# Patient Record
Sex: MALE | Race: NATIVE HAWAIIAN OR OTHER PACIFIC ISLANDER | HISPANIC OR LATINO
[De-identification: names, ages, dates, MRNs, and addresses within clinical notes are randomized per-mention and may not be internally consistent; named-entity substitution may affect disease eponyms.]

---

## 2018-06-14 ENCOUNTER — APPOINTMENT (OUTPATIENT)
Dept: INTERNAL MEDICINE | Facility: CLINIC | Age: 53
End: 2018-06-14
Payer: MEDICAID

## 2018-06-14 VITALS
WEIGHT: 201 LBS | DIASTOLIC BLOOD PRESSURE: 84 MMHG | OXYGEN SATURATION: 95 % | TEMPERATURE: 98.2 F | BODY MASS INDEX: 28.77 KG/M2 | HEART RATE: 66 BPM | SYSTOLIC BLOOD PRESSURE: 124 MMHG | HEIGHT: 70 IN

## 2018-06-14 VITALS — SYSTOLIC BLOOD PRESSURE: 130 MMHG | DIASTOLIC BLOOD PRESSURE: 90 MMHG

## 2018-06-14 DIAGNOSIS — R10.9 UNSPECIFIED ABDOMINAL PAIN: ICD-10-CM

## 2018-06-14 PROCEDURE — 36415 COLL VENOUS BLD VENIPUNCTURE: CPT

## 2018-06-14 PROCEDURE — 81003 URINALYSIS AUTO W/O SCOPE: CPT | Mod: QW

## 2018-06-14 PROCEDURE — 99204 OFFICE O/P NEW MOD 45 MIN: CPT | Mod: 25

## 2018-06-14 NOTE — PHYSICAL EXAM
[No Acute Distress] : no acute distress [Well Nourished] : well nourished [Well Developed] : well developed [Well-Appearing] : well-appearing [Normal Sclera/Conjunctiva] : normal sclera/conjunctiva [PERRL] : pupils equal round and reactive to light [Normal Outer Ear/Nose] : the outer ears and nose were normal in appearance [Normal Oropharynx] : the oropharynx was normal [No JVD] : no jugular venous distention [Supple] : supple [No Lymphadenopathy] : no lymphadenopathy [Thyroid Normal, No Nodules] : the thyroid was normal and there were no nodules present [No Respiratory Distress] : no respiratory distress  [Clear to Auscultation] : lungs were clear to auscultation bilaterally [No Accessory Muscle Use] : no accessory muscle use [Normal Rate] : normal rate  [Regular Rhythm] : with a regular rhythm [Normal S1, S2] : normal S1 and S2 [No Murmur] : no murmur heard [No Carotid Bruits] : no carotid bruits [No Abdominal Bruit] : a ~M bruit was not heard ~T in the abdomen [No Edema] : there was no peripheral edema [No Extremity Clubbing/Cyanosis] : no extremity clubbing/cyanosis [Normal Appearance] : normal in appearance [No Masses] : no palpable masses [No Axillary Lymphadenopathy] : no axillary lymphadenopathy [Soft] : abdomen soft [Non Tender] : non-tender [Non-distended] : non-distended [No HSM] : no HSM [Normal Bowel Sounds] : normal bowel sounds [Normal Supraclavicular Nodes] : no supraclavicular lymphadenopathy [Normal Axillary Nodes] : no axillary lymphadenopathy [Normal Anterior Cervical Nodes] : no anterior cervical lymphadenopathy [Normal Gait] : normal gait [Coordination Grossly Intact] : coordination grossly intact [Speech Grossly Normal] : speech grossly normal [Memory Grossly Normal] : memory grossly normal [Normal Affect] : the affect was normal [Normal Mood] : the mood was normal [Normal Insight/Judgement] : insight and judgment were intact [de-identified] : Lesion on scalp

## 2018-06-14 NOTE — REVIEW OF SYSTEMS
[Back Pain] : back pain [Negative] : Heme/Lymph [Joint Pain] : no joint pain [Joint Stiffness] : no joint stiffness [Muscle Pain] : no muscle pain [Muscle Weakness] : no muscle weakness [Joint Swelling] : no joint swelling

## 2018-06-14 NOTE — ASSESSMENT
[FreeTextEntry1] : Patient is a 52-year-old  male with history of nephrolithiasis who was traveling through the [place] developed acute onset flank pain and transaminitis who presents for followup\par \par #1 flank pain one residual 2/10 pain\par Urine dip negative for hematuria white cells\par Observe Tylenol for pain\par \par #2 isolated elevated blood pressure\par Observe\par \par #3 possible transaminitis\par Check LFTs\par \par #4 scalp lesion\par Referral to dermatology\par \par #5 health maintenance\par Check routine bloods\par Colonoscopy 2 years ago normal\par Followup in 2 weeks schedule CPE in future

## 2018-06-15 LAB
ALBUMIN SERPL ELPH-MCNC: 4.3 G/DL
ALP BLD-CCNC: 88 U/L
ALT SERPL-CCNC: 44 U/L
ANION GAP SERPL CALC-SCNC: 13 MMOL/L
AST SERPL-CCNC: 36 U/L
BASOPHILS # BLD AUTO: 0.04 K/UL
BASOPHILS NFR BLD AUTO: 0.5 %
BILIRUB SERPL-MCNC: 0.3 MG/DL
BUN SERPL-MCNC: 13 MG/DL
CALCIUM SERPL-MCNC: 9.6 MG/DL
CHLORIDE SERPL-SCNC: 100 MMOL/L
CHOLEST SERPL-MCNC: 198 MG/DL
CHOLEST/HDLC SERPL: 7.1 RATIO
CO2 SERPL-SCNC: 25 MMOL/L
CREAT SERPL-MCNC: 1.22 MG/DL
EOSINOPHIL # BLD AUTO: 0.11 K/UL
EOSINOPHIL NFR BLD AUTO: 1.5 %
GLUCOSE SERPL-MCNC: 78 MG/DL
HBA1C MFR BLD HPLC: 6.3 %
HBV SURFACE AB SER QL: REACTIVE
HBV SURFACE AG SER QL: NONREACTIVE
HCT VFR BLD CALC: 45.5 %
HCV AB SER QL: NONREACTIVE
HCV S/CO RATIO: 0.19 S/CO
HDLC SERPL-MCNC: 28 MG/DL
HGB BLD-MCNC: 14.9 G/DL
IMM GRANULOCYTES NFR BLD AUTO: 0.1 %
LDLC SERPL CALC-MCNC: 144 MG/DL
LYMPHOCYTES # BLD AUTO: 2.19 K/UL
LYMPHOCYTES NFR BLD AUTO: 29.8 %
MAN DIFF?: NORMAL
MCHC RBC-ENTMCNC: 29.4 PG
MCHC RBC-ENTMCNC: 32.7 GM/DL
MCV RBC AUTO: 89.7 FL
MONOCYTES # BLD AUTO: 0.54 K/UL
MONOCYTES NFR BLD AUTO: 7.3 %
NEUTROPHILS # BLD AUTO: 4.46 K/UL
NEUTROPHILS NFR BLD AUTO: 60.8 %
PLATELET # BLD AUTO: 239 K/UL
POTASSIUM SERPL-SCNC: 4.5 MMOL/L
PROT SERPL-MCNC: 8.5 G/DL
PSA SERPL-MCNC: 1.31 NG/ML
RBC # BLD: 5.07 M/UL
RBC # FLD: 14.2 %
SODIUM SERPL-SCNC: 138 MMOL/L
TRIGL SERPL-MCNC: 131 MG/DL
WBC # FLD AUTO: 7.35 K/UL

## 2018-06-28 ENCOUNTER — EMERGENCY (EMERGENCY)
Facility: HOSPITAL | Age: 53
LOS: 1 days | Discharge: HOME | End: 2018-06-28
Attending: EMERGENCY MEDICINE
Payer: MEDICAID

## 2018-06-28 VITALS
DIASTOLIC BLOOD PRESSURE: 98 MMHG | OXYGEN SATURATION: 100 % | SYSTOLIC BLOOD PRESSURE: 148 MMHG | RESPIRATION RATE: 18 BRPM | HEART RATE: 78 BPM

## 2018-06-28 VITALS
DIASTOLIC BLOOD PRESSURE: 92 MMHG | TEMPERATURE: 98 F | RESPIRATION RATE: 18 BRPM | SYSTOLIC BLOOD PRESSURE: 142 MMHG | OXYGEN SATURATION: 99 % | HEART RATE: 76 BPM

## 2018-06-28 LAB
ALBUMIN SERPL ELPH-MCNC: 4 G/DL — SIGNIFICANT CHANGE UP (ref 3.3–5)
ALP SERPL-CCNC: 88 U/L — SIGNIFICANT CHANGE UP (ref 40–120)
ALT FLD-CCNC: 37 U/L — SIGNIFICANT CHANGE UP (ref 10–45)
ANION GAP SERPL CALC-SCNC: 11 MMOL/L — SIGNIFICANT CHANGE UP (ref 5–17)
APPEARANCE UR: CLEAR — SIGNIFICANT CHANGE UP
AST SERPL-CCNC: 27 U/L — SIGNIFICANT CHANGE UP (ref 10–40)
BASOPHILS # BLD AUTO: 0 K/UL — SIGNIFICANT CHANGE UP (ref 0–0.2)
BASOPHILS NFR BLD AUTO: 0.4 % — SIGNIFICANT CHANGE UP (ref 0–2)
BILIRUB SERPL-MCNC: 0.3 MG/DL — SIGNIFICANT CHANGE UP (ref 0.2–1.2)
BILIRUB UR-MCNC: NEGATIVE — SIGNIFICANT CHANGE UP
BUN SERPL-MCNC: 14 MG/DL — SIGNIFICANT CHANGE UP (ref 7–23)
CALCIUM SERPL-MCNC: 9.4 MG/DL — SIGNIFICANT CHANGE UP (ref 8.4–10.5)
CHLORIDE SERPL-SCNC: 100 MMOL/L — SIGNIFICANT CHANGE UP (ref 96–108)
CO2 SERPL-SCNC: 25 MMOL/L — SIGNIFICANT CHANGE UP (ref 22–31)
COLOR SPEC: SIGNIFICANT CHANGE UP
COMMENT - URINE: SIGNIFICANT CHANGE UP
CREAT SERPL-MCNC: 1.05 MG/DL — SIGNIFICANT CHANGE UP (ref 0.5–1.3)
DIFF PNL FLD: NEGATIVE — SIGNIFICANT CHANGE UP
EOSINOPHIL # BLD AUTO: 0.1 K/UL — SIGNIFICANT CHANGE UP (ref 0–0.5)
EOSINOPHIL NFR BLD AUTO: 0.8 % — SIGNIFICANT CHANGE UP (ref 0–6)
EPI CELLS # UR: SIGNIFICANT CHANGE UP /HPF
GLUCOSE SERPL-MCNC: 163 MG/DL — HIGH (ref 70–99)
GLUCOSE UR QL: NEGATIVE — SIGNIFICANT CHANGE UP
HCT VFR BLD CALC: 43.4 % — SIGNIFICANT CHANGE UP (ref 39–50)
HGB BLD-MCNC: 14.6 G/DL — SIGNIFICANT CHANGE UP (ref 13–17)
KETONES UR-MCNC: NEGATIVE — SIGNIFICANT CHANGE UP
LEUKOCYTE ESTERASE UR-ACNC: NEGATIVE — SIGNIFICANT CHANGE UP
LYMPHOCYTES # BLD AUTO: 1.3 K/UL — SIGNIFICANT CHANGE UP (ref 1–3.3)
LYMPHOCYTES # BLD AUTO: 14.6 % — SIGNIFICANT CHANGE UP (ref 13–44)
MCHC RBC-ENTMCNC: 30.3 PG — SIGNIFICANT CHANGE UP (ref 27–34)
MCHC RBC-ENTMCNC: 33.6 GM/DL — SIGNIFICANT CHANGE UP (ref 32–36)
MCV RBC AUTO: 90 FL — SIGNIFICANT CHANGE UP (ref 80–100)
MONOCYTES # BLD AUTO: 0.3 K/UL — SIGNIFICANT CHANGE UP (ref 0–0.9)
MONOCYTES NFR BLD AUTO: 3.4 % — SIGNIFICANT CHANGE UP (ref 2–14)
NEUTROPHILS # BLD AUTO: 7.1 K/UL — SIGNIFICANT CHANGE UP (ref 1.8–7.4)
NEUTROPHILS NFR BLD AUTO: 80.8 % — HIGH (ref 43–77)
NITRITE UR-MCNC: NEGATIVE — SIGNIFICANT CHANGE UP
PH UR: 6 — SIGNIFICANT CHANGE UP (ref 5–8)
PLATELET # BLD AUTO: 184 K/UL — SIGNIFICANT CHANGE UP (ref 150–400)
POTASSIUM SERPL-MCNC: 3.9 MMOL/L — SIGNIFICANT CHANGE UP (ref 3.5–5.3)
POTASSIUM SERPL-SCNC: 3.9 MMOL/L — SIGNIFICANT CHANGE UP (ref 3.5–5.3)
PROT SERPL-MCNC: 7.7 G/DL — SIGNIFICANT CHANGE UP (ref 6–8.3)
PROT UR-MCNC: NEGATIVE — SIGNIFICANT CHANGE UP
RBC # BLD: 4.82 M/UL — SIGNIFICANT CHANGE UP (ref 4.2–5.8)
RBC # FLD: 12.4 % — SIGNIFICANT CHANGE UP (ref 10.3–14.5)
RBC CASTS # UR COMP ASSIST: SIGNIFICANT CHANGE UP /HPF (ref 0–2)
SODIUM SERPL-SCNC: 136 MMOL/L — SIGNIFICANT CHANGE UP (ref 135–145)
SP GR SPEC: 1.02 — SIGNIFICANT CHANGE UP (ref 1.01–1.02)
UROBILINOGEN FLD QL: NEGATIVE — SIGNIFICANT CHANGE UP
WBC # BLD: 8.8 K/UL — SIGNIFICANT CHANGE UP (ref 3.8–10.5)
WBC # FLD AUTO: 8.8 K/UL — SIGNIFICANT CHANGE UP (ref 3.8–10.5)
WBC UR QL: SIGNIFICANT CHANGE UP /HPF (ref 0–5)

## 2018-06-28 PROCEDURE — 99284 EMERGENCY DEPT VISIT MOD MDM: CPT | Mod: 25

## 2018-06-28 PROCEDURE — 87086 URINE CULTURE/COLONY COUNT: CPT

## 2018-06-28 PROCEDURE — 99283 EMERGENCY DEPT VISIT LOW MDM: CPT

## 2018-06-28 PROCEDURE — 80053 COMPREHEN METABOLIC PANEL: CPT

## 2018-06-28 PROCEDURE — 81001 URINALYSIS AUTO W/SCOPE: CPT

## 2018-06-28 PROCEDURE — 85027 COMPLETE CBC AUTOMATED: CPT

## 2018-06-28 RX ORDER — ACETAMINOPHEN 500 MG
975 TABLET ORAL ONCE
Qty: 0 | Refills: 0 | Status: COMPLETED | OUTPATIENT
Start: 2018-06-28 | End: 2018-06-28

## 2018-06-28 RX ORDER — IBUPROFEN 200 MG
600 TABLET ORAL ONCE
Qty: 0 | Refills: 0 | Status: COMPLETED | OUTPATIENT
Start: 2018-06-28 | End: 2018-06-28

## 2018-06-28 RX ADMIN — Medication 600 MILLIGRAM(S): at 06:31

## 2018-06-28 RX ADMIN — Medication 975 MILLIGRAM(S): at 06:31

## 2018-06-28 NOTE — ED ADULT NURSE NOTE - PMH
Pt verbalized that she called the dr's office to find out what were Nephrolithiasis    Seasonal Allergies

## 2018-06-28 NOTE — ED PROVIDER NOTE - MEDICAL DECISION MAKING DETAILS
Plan for screening labs/UA, pain control, re-evaluate, clinically very benign abdomen so low pretest probability for surgical emergency, patient seems too comfortable for renal colic, low pretest probability for aortic catastrophe.

## 2018-06-28 NOTE — ED PROVIDER NOTE - ATTENDING CONTRIBUTION TO CARE
Patient with history of prior kidney stones presenting with back pain radiating into the abdomen associated with nausea and vomiting x2 overnight.  Reportedly feels like prior renal stones.  No fevers or chills, normal bowel movements, no home pain medications.  On exam patient very well appearing, no distress, vital signs within normal limits, equal radial pulses, abdomen soft, non tender, non distended, no palpable masses, bilateral lumbar paraspinal tenderness reproduces complaint.  Plan for screening labs/UA, pain control, re-evaluate, clinically very benign abdomen so low pretest probability for surgical emergency, patient seems too comfortable for renal colic, low pretest probability for aortic catastrophe. Patient seen and evaluated with resident/NP/PA, however HPI, ROS, PE and MDM as documented authored by myself - Cliff Caldwell MD

## 2018-06-28 NOTE — ED PROVIDER NOTE - PHYSICAL EXAMINATION
On exam patient very well appearing, no distress, vital signs within normal limits, equal radial pulses, abdomen soft, non tender, non distended, no palpable masses, bilateral lumbar paraspinal tenderness reproduces complaint.

## 2018-06-28 NOTE — ED ADULT NURSE NOTE - OBJECTIVE STATEMENT
53 y/o male presenting to ED c/o abd. pain, pt has hx of kidney stones. States pain feels the same as the last kidney stone flare up. Safety and comfort measures maintained. Patient undressed and placed into gown, call bell in hand and side rails up for safety. warm blanket provided, vital signs stable, pt in no acute distress.

## 2018-06-28 NOTE — ED PROVIDER NOTE - OBJECTIVE STATEMENT
Patient with history of prior kidney stones presenting with back pain radiating into the abdomen associated with nausea and vomiting x2 overnight.  Reportedly feels like prior renal stones.  No fevers or chills, normal bowel movements, no home pain medications

## 2018-06-29 LAB
CULTURE RESULTS: NO GROWTH — SIGNIFICANT CHANGE UP
SPECIMEN SOURCE: SIGNIFICANT CHANGE UP

## 2018-07-19 ENCOUNTER — APPOINTMENT (OUTPATIENT)
Dept: INTERNAL MEDICINE | Facility: CLINIC | Age: 53
End: 2018-07-19
Payer: MEDICAID

## 2018-07-19 VITALS
WEIGHT: 200 LBS | SYSTOLIC BLOOD PRESSURE: 126 MMHG | HEART RATE: 86 BPM | OXYGEN SATURATION: 99 % | BODY MASS INDEX: 28.63 KG/M2 | DIASTOLIC BLOOD PRESSURE: 80 MMHG | TEMPERATURE: 97.5 F | HEIGHT: 70 IN

## 2018-07-19 VITALS — SYSTOLIC BLOOD PRESSURE: 132 MMHG | DIASTOLIC BLOOD PRESSURE: 92 MMHG

## 2018-07-19 DIAGNOSIS — L98.9 DISORDER OF THE SKIN AND SUBCUTANEOUS TISSUE, UNSPECIFIED: ICD-10-CM

## 2018-07-19 PROCEDURE — 99214 OFFICE O/P EST MOD 30 MIN: CPT

## 2018-07-19 NOTE — PHYSICAL EXAM
[No Acute Distress] : no acute distress [Well Nourished] : well nourished [Well Developed] : well developed [Well-Appearing] : well-appearing [Normal Voice/Communication] : normal voice/communication [Normal Sclera/Conjunctiva] : normal sclera/conjunctiva [PERRL] : pupils equal round and reactive to light [Normal Outer Ear/Nose] : the outer ears and nose were normal in appearance [Normal Oropharynx] : the oropharynx was normal [No JVD] : no jugular venous distention [Supple] : supple [No Lymphadenopathy] : no lymphadenopathy [No Respiratory Distress] : no respiratory distress  [Clear to Auscultation] : lungs were clear to auscultation bilaterally [No Accessory Muscle Use] : no accessory muscle use [Normal Rate] : normal rate  [Regular Rhythm] : with a regular rhythm [Normal S1, S2] : normal S1 and S2 [No Murmur] : no murmur heard [Soft] : abdomen soft [Non Tender] : non-tender [Non-distended] : non-distended [No HSM] : no HSM [Normal Bowel Sounds] : normal bowel sounds [No CVA Tenderness] : no CVA  tenderness [No Spinal Tenderness] : no spinal tenderness

## 2018-07-19 NOTE — HISTORY OF PRESENT ILLNESS
[FreeTextEntry1] : F/u flank pain and transaminitis [de-identified] : Patient is a 52-year-old male with history of nephrolithiasis who presents for followup patient feels well no further flank pain dysuria but noted to have prediabetes and an isolated elevated blood pressure. Patient feels well denies chest pain, shortness of breath, lightheadedness, dizziness drinking iced coffee

## 2018-07-19 NOTE — ASSESSMENT
[FreeTextEntry1] : Patient is a 52-year-old male with history of overweight nephrolithiasis who presents for followup and noted to be borderline hypertensive and prediabetic\par \par #1 isolated elevated blood pressure\par  on diet decrease caffeine\par Decrease salt intake\par Observe followup in 2 months\par \par #2 prediabetes\par Last hemoglobin A1c 6.3\par Discuss diet\par \par #3 borderline hypercholesterolemia\par  HDL 28\par Discuss diet and exercise\par \par #4 scalp lesion\par Referral to dermatology\par \par #5 health maintenance\par Schedule CPE\par \par #6 flank pain history of lithiasis\par Presently asymptomatic observe encourage hydration

## 2018-07-19 NOTE — HEALTH RISK ASSESSMENT
[] : No [No falls in past year] : Patient reported no falls in the past year [0] : 2) Feeling down, depressed, or hopeless: Not at all (0) [de-identified] : social

## 2018-08-02 ENCOUNTER — APPOINTMENT (OUTPATIENT)
Dept: INTERNAL MEDICINE | Facility: CLINIC | Age: 53
End: 2018-08-02
Payer: MEDICAID

## 2018-08-02 VITALS — SYSTOLIC BLOOD PRESSURE: 124 MMHG | DIASTOLIC BLOOD PRESSURE: 80 MMHG

## 2018-08-02 VITALS
HEART RATE: 79 BPM | BODY MASS INDEX: 27.63 KG/M2 | WEIGHT: 193 LBS | HEIGHT: 70 IN | DIASTOLIC BLOOD PRESSURE: 80 MMHG | SYSTOLIC BLOOD PRESSURE: 128 MMHG | OXYGEN SATURATION: 97 %

## 2018-08-02 DIAGNOSIS — E66.3 OVERWEIGHT: ICD-10-CM

## 2018-08-02 DIAGNOSIS — R03.0 ELEVATED BLOOD-PRESSURE READING, W/OUT DIAGNOSIS OF HYPERTENSION: ICD-10-CM

## 2018-08-02 PROCEDURE — 99214 OFFICE O/P EST MOD 30 MIN: CPT

## 2018-08-02 NOTE — PHYSICAL EXAM
[No Acute Distress] : no acute distress [Well Nourished] : well nourished [Well Developed] : well developed [Well-Appearing] : well-appearing [No JVD] : no jugular venous distention [Supple] : supple [No Lymphadenopathy] : no lymphadenopathy [No Respiratory Distress] : no respiratory distress  [Clear to Auscultation] : lungs were clear to auscultation bilaterally [No Accessory Muscle Use] : no accessory muscle use [Normal Rate] : normal rate  [Regular Rhythm] : with a regular rhythm [Normal S1, S2] : normal S1 and S2 [No Abdominal Bruit] : a ~M bruit was not heard ~T in the abdomen [No Varicosities] : no varicosities [No Extremity Clubbing/Cyanosis] : no extremity clubbing/cyanosis [No Palpable Aorta] : no palpable aorta [Soft] : abdomen soft [Non Tender] : non-tender [Non-distended] : non-distended [No HSM] : no HSM [Normal Bowel Sounds] : normal bowel sounds [No CVA Tenderness] : no CVA  tenderness [No Spinal Tenderness] : no spinal tenderness

## 2018-08-02 NOTE — ASSESSMENT
[FreeTextEntry1] : Patient is a 52-year-old man with history of overweight, nephrolithiasis, isolated elevated blood pressure, prediabetes and borderline hypercholesterolemia who presents for followup of hypertension\par \par #1 hypertension\par Normal tensive today\par Observe\par \par #2 overweight\par Lost 7 pounds since last visit\par Continue diet exercise goal weight around one 8185\par \par #3 prediabetes\par Asymptomatic observe\par \par #4 borderline hypercholesterolemia\par  on diet\par Followup in 3-4 months

## 2018-08-02 NOTE — HISTORY OF PRESENT ILLNESS
[FreeTextEntry1] : followup for hypertension overweight and type 2 diabetes [de-identified] : Patient is a 52-year-old male with history of nephrolithiasis overweight, prediabetes, elevated blood pressure, borderline hyperlipidemia who presents for followup patient feels well has lost 70 pounds since last visit feels well denies headache, dizziness, lightheadedness, palpitation, polyuria,

## 2018-11-15 ENCOUNTER — APPOINTMENT (OUTPATIENT)
Dept: INTERNAL MEDICINE | Facility: CLINIC | Age: 53
End: 2018-11-15

## 2018-12-17 ENCOUNTER — APPOINTMENT (OUTPATIENT)
Dept: INTERNAL MEDICINE | Facility: CLINIC | Age: 53
End: 2018-12-17
Payer: MEDICAID

## 2018-12-17 VITALS
OXYGEN SATURATION: 98 % | SYSTOLIC BLOOD PRESSURE: 112 MMHG | DIASTOLIC BLOOD PRESSURE: 68 MMHG | HEART RATE: 77 BPM | BODY MASS INDEX: 27.35 KG/M2 | HEIGHT: 70 IN | TEMPERATURE: 98.9 F | WEIGHT: 191 LBS

## 2018-12-17 DIAGNOSIS — E78.5 HYPERLIPIDEMIA, UNSPECIFIED: ICD-10-CM

## 2018-12-17 DIAGNOSIS — R73.03 PREDIABETES.: ICD-10-CM

## 2018-12-17 DIAGNOSIS — R76.9 ABNORMAL IMMUNOLOGICAL FINDING IN SERUM, UNSPECIFIED: ICD-10-CM

## 2018-12-17 LAB
BILIRUB UR QL STRIP: NORMAL
CLARITY UR: CLEAR
COLLECTION METHOD: NORMAL
GLUCOSE UR-MCNC: NORMAL
HCG UR QL: 0.2 EU/DL
HGB UR QL STRIP.AUTO: NORMAL
KETONES UR-MCNC: NORMAL
LEUKOCYTE ESTERASE UR QL STRIP: NORMAL
NITRITE UR QL STRIP: NORMAL
PH UR STRIP: 7
PROT UR STRIP-MCNC: NORMAL
SP GR UR STRIP: 1.02

## 2018-12-17 PROCEDURE — 36415 COLL VENOUS BLD VENIPUNCTURE: CPT

## 2018-12-17 PROCEDURE — 81003 URINALYSIS AUTO W/O SCOPE: CPT | Mod: QW

## 2018-12-17 PROCEDURE — G0008: CPT

## 2018-12-17 PROCEDURE — 90686 IIV4 VACC NO PRSV 0.5 ML IM: CPT

## 2018-12-17 PROCEDURE — 99214 OFFICE O/P EST MOD 30 MIN: CPT | Mod: 25

## 2018-12-17 NOTE — PHYSICAL EXAM
[No Acute Distress] : no acute distress [Well Nourished] : well nourished [Well Developed] : well developed [Well-Appearing] : well-appearing [Normal Voice/Communication] : normal voice/communication [No JVD] : no jugular venous distention [Supple] : supple [No Respiratory Distress] : no respiratory distress  [Clear to Auscultation] : lungs were clear to auscultation bilaterally [No Accessory Muscle Use] : no accessory muscle use [Normal Rate] : normal rate  [Regular Rhythm] : with a regular rhythm [Normal S1, S2] : normal S1 and S2 [No Murmur] : no murmur heard [No Carotid Bruits] : no carotid bruits [No Abdominal Bruit] : a ~M bruit was not heard ~T in the abdomen [No Edema] : there was no peripheral edema [Normal Supraclavicular Nodes] : no supraclavicular lymphadenopathy [Normal Anterior Cervical Nodes] : no anterior cervical lymphadenopathy [de-identified] : Mild left flank tenderness

## 2018-12-17 NOTE — HEALTH RISK ASSESSMENT
[No falls in past year] : Patient reported no falls in the past year [0] : 2) Feeling down, depressed, or hopeless: Not at all (0) [] : No [de-identified] : social

## 2018-12-17 NOTE — ASSESSMENT
[FreeTextEntry1] : Patient is a 53-year-old  male with history of overweight, prediabetes, isolated elevated blood pressure, hyperlipidemia borderline, nephrolithiasis who presents for follow-up of isolated elevated blood pressure, prediabetes, borderline hypercholesterolemia and protein\par \par 1.  Abnormal total protein\par Repeat total protein\par \par 2.  Left flank pain history of nephrolithiasis\par Check UA\par \par 3.  Prediabetes\par Hemoglobin A1c lost 10 pounds\par \par 4.  Hyperlipidemia\par lipid Profile lost 10 pounds\par \par 5.  Health maintenance\par Influenza vaccine today\par \par

## 2018-12-17 NOTE — HISTORY OF PRESENT ILLNESS
[FreeTextEntry1] : Follow-up for abnormal total protein, diabetes and hyperlipidemia [de-identified] : \par \par The patient is a 53-year-old male with history of overweight, prediabetes, elevated total protein blood pressure and borderline hypercholesterolemia who presents for follow-up patient feels well remains on diet avoids carbs weights around 191 stable as high as 201 pounds he denies polyuria polydipsia chest pain shortness of breath

## 2019-03-19 LAB
ANION GAP SERPL CALC-SCNC: 10 MMOL/L
BUN SERPL-MCNC: 17 MG/DL
CALCIUM SERPL-MCNC: 9.4 MG/DL
CHLORIDE SERPL-SCNC: 102 MMOL/L
CHOLEST SERPL-MCNC: 194 MG/DL
CHOLEST/HDLC SERPL: 5.7 RATIO
CO2 SERPL-SCNC: 27 MMOL/L
CREAT SERPL-MCNC: 1.12 MG/DL
GLUCOSE SERPL-MCNC: 88 MG/DL
HBA1C MFR BLD HPLC: 5.9 %
HDLC SERPL-MCNC: 34 MG/DL
LDLC SERPL CALC-MCNC: 146 MG/DL
POTASSIUM SERPL-SCNC: 4.2 MMOL/L
PROT SERPL-MCNC: 8.1 G/DL
SODIUM SERPL-SCNC: 139 MMOL/L
TRIGL SERPL-MCNC: 69 MG/DL

## 2019-05-08 ENCOUNTER — TRANSCRIPTION ENCOUNTER (OUTPATIENT)
Age: 54
End: 2019-05-08

## 2019-06-20 ENCOUNTER — APPOINTMENT (OUTPATIENT)
Dept: INTERNAL MEDICINE | Facility: CLINIC | Age: 54
End: 2019-06-20

## 2019-11-19 NOTE — HISTORY OF PRESENT ILLNESS
[Dear  ___] : Dear  [unfilled], [Consult Letter:] : I had the pleasure of evaluating your patient, [unfilled]. [DrAmarilys  ___] : Dr. ABRAMS [FreeTextEntry8] : CC: bilateral flank pain\par Patient is a 52-year-old  male with history of nephrolithiasis who was vacationing in the [place] in April developed bilateral flank plain with a presumed diagnosis of kidney stone sonogram done in the [place] negative for kidney stones also noted to have elevated liver functions now presents for evaluation and care the patient still complains of residual flank pain denies abdominal pain, nausea, vomiting, diarrhea otherwise patient feels well\par \par

## 2019-11-21 ENCOUNTER — INPATIENT (INPATIENT)
Facility: HOSPITAL | Age: 54
LOS: 1 days | Discharge: HOME | DRG: 446 | End: 2019-11-23
Attending: SURGERY | Admitting: SURGERY
Payer: MEDICAID

## 2019-11-21 VITALS
HEART RATE: 85 BPM | DIASTOLIC BLOOD PRESSURE: 99 MMHG | TEMPERATURE: 98 F | HEIGHT: 69 IN | WEIGHT: 199.96 LBS | RESPIRATION RATE: 18 BRPM | OXYGEN SATURATION: 100 % | SYSTOLIC BLOOD PRESSURE: 183 MMHG

## 2019-11-21 DIAGNOSIS — R10.13 EPIGASTRIC PAIN: ICD-10-CM

## 2019-11-21 DIAGNOSIS — Z90.49 ACQUIRED ABSENCE OF OTHER SPECIFIED PARTS OF DIGESTIVE TRACT: Chronic | ICD-10-CM

## 2019-11-21 LAB
ALBUMIN SERPL ELPH-MCNC: 4.4 G/DL — SIGNIFICANT CHANGE UP (ref 3.3–5)
ALP SERPL-CCNC: 79 U/L — SIGNIFICANT CHANGE UP (ref 40–120)
ALT FLD-CCNC: 46 U/L — HIGH (ref 10–45)
ANION GAP SERPL CALC-SCNC: 12 MMOL/L — SIGNIFICANT CHANGE UP (ref 5–17)
APPEARANCE UR: CLEAR — SIGNIFICANT CHANGE UP
APTT BLD: 30.8 SEC — SIGNIFICANT CHANGE UP (ref 27.5–36.3)
AST SERPL-CCNC: 30 U/L — SIGNIFICANT CHANGE UP (ref 10–40)
BACTERIA # UR AUTO: 0 — SIGNIFICANT CHANGE UP
BASOPHILS # BLD AUTO: 0.04 K/UL — SIGNIFICANT CHANGE UP (ref 0–0.2)
BASOPHILS NFR BLD AUTO: 0.6 % — SIGNIFICANT CHANGE UP (ref 0–2)
BILIRUB SERPL-MCNC: 0.2 MG/DL — SIGNIFICANT CHANGE UP (ref 0.2–1.2)
BILIRUB UR-MCNC: NEGATIVE — SIGNIFICANT CHANGE UP
BLD GP AB SCN SERPL QL: NEGATIVE — SIGNIFICANT CHANGE UP
BUN SERPL-MCNC: 13 MG/DL — SIGNIFICANT CHANGE UP (ref 7–23)
CALCIUM SERPL-MCNC: 9.1 MG/DL — SIGNIFICANT CHANGE UP (ref 8.4–10.5)
CHLORIDE SERPL-SCNC: 101 MMOL/L — SIGNIFICANT CHANGE UP (ref 96–108)
CO2 SERPL-SCNC: 25 MMOL/L — SIGNIFICANT CHANGE UP (ref 22–31)
COLOR SPEC: SIGNIFICANT CHANGE UP
CREAT SERPL-MCNC: 1.04 MG/DL — SIGNIFICANT CHANGE UP (ref 0.5–1.3)
DIFF PNL FLD: NEGATIVE — SIGNIFICANT CHANGE UP
EOSINOPHIL # BLD AUTO: 0.11 K/UL — SIGNIFICANT CHANGE UP (ref 0–0.5)
EOSINOPHIL NFR BLD AUTO: 1.7 % — SIGNIFICANT CHANGE UP (ref 0–6)
EPI CELLS # UR: 0 /HPF — SIGNIFICANT CHANGE UP
GLUCOSE SERPL-MCNC: 151 MG/DL — HIGH (ref 70–99)
GLUCOSE UR QL: NEGATIVE — SIGNIFICANT CHANGE UP
HCT VFR BLD CALC: 42.2 % — SIGNIFICANT CHANGE UP (ref 39–50)
HGB BLD-MCNC: 14.5 G/DL — SIGNIFICANT CHANGE UP (ref 13–17)
HYALINE CASTS # UR AUTO: 2 /LPF — SIGNIFICANT CHANGE UP (ref 0–2)
IMM GRANULOCYTES NFR BLD AUTO: 0.2 % — SIGNIFICANT CHANGE UP (ref 0–1.5)
INR BLD: 1.25 RATIO — HIGH (ref 0.88–1.16)
KETONES UR-MCNC: NEGATIVE — SIGNIFICANT CHANGE UP
LEUKOCYTE ESTERASE UR-ACNC: NEGATIVE — SIGNIFICANT CHANGE UP
LIDOCAIN IGE QN: 29 U/L — SIGNIFICANT CHANGE UP (ref 7–60)
LYMPHOCYTES # BLD AUTO: 1.56 K/UL — SIGNIFICANT CHANGE UP (ref 1–3.3)
LYMPHOCYTES # BLD AUTO: 24 % — SIGNIFICANT CHANGE UP (ref 13–44)
MCHC RBC-ENTMCNC: 30.2 PG — SIGNIFICANT CHANGE UP (ref 27–34)
MCHC RBC-ENTMCNC: 34.4 GM/DL — SIGNIFICANT CHANGE UP (ref 32–36)
MCV RBC AUTO: 87.9 FL — SIGNIFICANT CHANGE UP (ref 80–100)
MONOCYTES # BLD AUTO: 0.51 K/UL — SIGNIFICANT CHANGE UP (ref 0–0.9)
MONOCYTES NFR BLD AUTO: 7.9 % — SIGNIFICANT CHANGE UP (ref 2–14)
NEUTROPHILS # BLD AUTO: 4.26 K/UL — SIGNIFICANT CHANGE UP (ref 1.8–7.4)
NEUTROPHILS NFR BLD AUTO: 65.6 % — SIGNIFICANT CHANGE UP (ref 43–77)
NITRITE UR-MCNC: NEGATIVE — SIGNIFICANT CHANGE UP
NRBC # BLD: 0 /100 WBCS — SIGNIFICANT CHANGE UP (ref 0–0)
PH UR: 7.5 — SIGNIFICANT CHANGE UP (ref 5–8)
PLATELET # BLD AUTO: 209 K/UL — SIGNIFICANT CHANGE UP (ref 150–400)
POTASSIUM SERPL-MCNC: 3.8 MMOL/L — SIGNIFICANT CHANGE UP (ref 3.5–5.3)
POTASSIUM SERPL-SCNC: 3.8 MMOL/L — SIGNIFICANT CHANGE UP (ref 3.5–5.3)
PROT SERPL-MCNC: 7.8 G/DL — SIGNIFICANT CHANGE UP (ref 6–8.3)
PROT UR-MCNC: NEGATIVE — SIGNIFICANT CHANGE UP
PROTHROM AB SERPL-ACNC: 14.3 SEC — HIGH (ref 10–12.9)
RBC # BLD: 4.8 M/UL — SIGNIFICANT CHANGE UP (ref 4.2–5.8)
RBC # FLD: 13.7 % — SIGNIFICANT CHANGE UP (ref 10.3–14.5)
RBC CASTS # UR COMP ASSIST: 1 /HPF — SIGNIFICANT CHANGE UP (ref 0–4)
RH IG SCN BLD-IMP: NEGATIVE — SIGNIFICANT CHANGE UP
SODIUM SERPL-SCNC: 138 MMOL/L — SIGNIFICANT CHANGE UP (ref 135–145)
SP GR SPEC: 1.02 — SIGNIFICANT CHANGE UP (ref 1.01–1.02)
UROBILINOGEN FLD QL: NEGATIVE — SIGNIFICANT CHANGE UP
WBC # BLD: 6.49 K/UL — SIGNIFICANT CHANGE UP (ref 3.8–10.5)
WBC # FLD AUTO: 6.49 K/UL — SIGNIFICANT CHANGE UP (ref 3.8–10.5)
WBC UR QL: 0 /HPF — SIGNIFICANT CHANGE UP (ref 0–5)

## 2019-11-21 PROCEDURE — 76705 ECHO EXAM OF ABDOMEN: CPT | Mod: 26

## 2019-11-21 PROCEDURE — 76705 ECHO EXAM OF ABDOMEN: CPT | Mod: 26,RT

## 2019-11-21 PROCEDURE — 93010 ELECTROCARDIOGRAM REPORT: CPT

## 2019-11-21 PROCEDURE — 71045 X-RAY EXAM CHEST 1 VIEW: CPT | Mod: 26

## 2019-11-21 PROCEDURE — 99221 1ST HOSP IP/OBS SF/LOW 40: CPT

## 2019-11-21 PROCEDURE — 99285 EMERGENCY DEPT VISIT HI MDM: CPT

## 2019-11-21 PROCEDURE — 74183 MRI ABD W/O CNTR FLWD CNTR: CPT | Mod: 26

## 2019-11-21 RX ORDER — INFLUENZA VIRUS VACCINE 15; 15; 15; 15 UG/.5ML; UG/.5ML; UG/.5ML; UG/.5ML
0.5 SUSPENSION INTRAMUSCULAR ONCE
Refills: 0 | Status: COMPLETED | OUTPATIENT
Start: 2019-11-21 | End: 2019-11-23

## 2019-11-21 RX ORDER — SODIUM CHLORIDE 9 MG/ML
1000 INJECTION INTRAMUSCULAR; INTRAVENOUS; SUBCUTANEOUS ONCE
Refills: 0 | Status: COMPLETED | OUTPATIENT
Start: 2019-11-21 | End: 2019-11-21

## 2019-11-21 RX ORDER — CEFOTETAN DISODIUM 1 G
1 VIAL (EA) INJECTION ONCE
Refills: 0 | Status: COMPLETED | OUTPATIENT
Start: 2019-11-21 | End: 2019-11-21

## 2019-11-21 RX ORDER — ONDANSETRON 8 MG/1
4 TABLET, FILM COATED ORAL ONCE
Refills: 0 | Status: COMPLETED | OUTPATIENT
Start: 2019-11-21 | End: 2019-11-21

## 2019-11-21 RX ORDER — SODIUM CHLORIDE 9 MG/ML
1000 INJECTION, SOLUTION INTRAVENOUS
Refills: 0 | Status: DISCONTINUED | OUTPATIENT
Start: 2019-11-21 | End: 2019-11-23

## 2019-11-21 RX ORDER — HEPARIN SODIUM 5000 [USP'U]/ML
5000 INJECTION INTRAVENOUS; SUBCUTANEOUS EVERY 12 HOURS
Refills: 0 | Status: DISCONTINUED | OUTPATIENT
Start: 2019-11-21 | End: 2019-11-23

## 2019-11-21 RX ORDER — CEFOTETAN DISODIUM 1 G
1 VIAL (EA) INJECTION EVERY 12 HOURS
Refills: 0 | Status: DISCONTINUED | OUTPATIENT
Start: 2019-11-21 | End: 2019-11-23

## 2019-11-21 RX ORDER — MORPHINE SULFATE 50 MG/1
4 CAPSULE, EXTENDED RELEASE ORAL ONCE
Refills: 0 | Status: DISCONTINUED | OUTPATIENT
Start: 2019-11-21 | End: 2019-11-21

## 2019-11-21 RX ADMIN — ONDANSETRON 4 MILLIGRAM(S): 8 TABLET, FILM COATED ORAL at 07:54

## 2019-11-21 RX ADMIN — SODIUM CHLORIDE 1000 MILLILITER(S): 9 INJECTION INTRAMUSCULAR; INTRAVENOUS; SUBCUTANEOUS at 07:54

## 2019-11-21 RX ADMIN — Medication 100 GRAM(S): at 12:37

## 2019-11-21 RX ADMIN — MORPHINE SULFATE 4 MILLIGRAM(S): 50 CAPSULE, EXTENDED RELEASE ORAL at 08:05

## 2019-11-21 RX ADMIN — HEPARIN SODIUM 5000 UNIT(S): 5000 INJECTION INTRAVENOUS; SUBCUTANEOUS at 17:10

## 2019-11-21 RX ADMIN — MORPHINE SULFATE 4 MILLIGRAM(S): 50 CAPSULE, EXTENDED RELEASE ORAL at 07:54

## 2019-11-21 NOTE — ED ADULT NURSE NOTE - NSIMPLEMENTINTERV_GEN_ALL_ED
Implemented All Universal Safety Interventions:  [place] to call system. Call bell, personal items and telephone within reach. Instruct patient to call for assistance. Room bathroom lighting operational. Non-slip footwear when patient is off stretcher. Physically safe environment: no spills, clutter or unnecessary equipment. Stretcher in lowest position, wheels locked, appropriate side rails in place.

## 2019-11-21 NOTE — ED PROVIDER NOTE - CHIEF COMPLAINT
The patient is a 53y Male complaining of The patient is a 53y Male complaining of abdominal pain/flank pain

## 2019-11-21 NOTE — H&P ADULT - NSICDXPASTSURGICALHX_GEN_ALL_CORE_FT
PAST SURGICAL HISTORY:  Acute Appendicitis s/p appendectomy 5 years ago.    cystoscopy, Ureteral stent     History of appendectomy

## 2019-11-21 NOTE — H&P ADULT - ATTENDING COMMENTS
Pt seen and examined yesterday, agree with above.     Pt presents with RUQ pain which woke him from sleep. He has had similar episodes in the past but never this severe and never lasting so long, which is why he sought medical attention. When I saw him, he noted that he was feeling better but still had moderate pain. He was not in any distress and his abdomen was soft and mildly TTP in the RUQ without peritonitis.    Labs reviewed, with very mild transaminase elevation.  Images personally reviewed: initial US was unclear regarding size of CBD, so repeat US was ordered and MRCP was ordered.   Repeat US showed normal CBD and multiple mobile gallstones with possible wall thickening.    A/P 52 y/o man with RUQ pain, gallstones on imaging, likely acute cholecystitis given ongoing pain and tenderness:  - Admit for IV antibiotics  - Laparoscopic, possible open cholecystectomy on this admission  - NPO  - IVF  - F/u MRCP results to ensure no choledocholithiasis

## 2019-11-21 NOTE — ED ADULT NURSE REASSESSMENT NOTE - NS ED NURSE REASSESS COMMENT FT1
Patient ambulated to the  bathroom said gown got wet and new gown and blanket provided and bed linens adjusted.

## 2019-11-21 NOTE — H&P ADULT - NSHPPHYSICALEXAM_GEN_ALL_CORE
Physical Exam:  	Gen: NAD, A&Ox3, WG/WD/WN  	HEENT: NC/AT, PERRL, supple neck, clear oropharynx, mucosa moist  	Pulm: CTA B/L  	CV: RRR  	Back: No spinal or (CVA) tenderness; no masses, hematomas, nor ecchymosis.  No step offs   	GI: +BS, soft, RUQ tenderness w/ refered pain to RLQ.  no rebound or guarding, nondistended                MSK:  FROMx4, no deformities                Vascular:  Warm equally bilateral,  no cyanosis, clubbing, nor edema  	Neuro: No focal deficits, sensation & strength grossly intact  	Psych: Normal affect and mood  	Skin: Warm, without rashes, with good turgor

## 2019-11-21 NOTE — ED ADULT NURSE NOTE - OBJECTIVE STATEMENT
53 year old male presents to ED c/o diffuse abdominal pain since this morning.  He said he had similar pain about 2 months ago while in [place] and had an ultrasound done which showed gallstones and the doctor told him to be checked out when he returned home to the [place].  Patient said pain resolved until this morning which is why he came to the ED.  Pain radiates to b/l flank and he said pain was worse after eating.  He did not taking anything for pain and said pain has been severe and constant since this morning.  He has also had some nausea, but denies: vomiting, chest pain, shortness of breath, fever, diarrhea, constipation, blood in stool or dysuria.

## 2019-11-21 NOTE — ED PROVIDER NOTE - OBJECTIVE STATEMENT
54 yo male with pmhx of kidney stones presenting with upper abdominal pain and BL flank pain for one day. Patient woke up at 3 am with right upper abdominal pain and nausea, nothing made pain better/worse. Has had previous kidney stones 1 year ago as well as ureteral stent in past.    Denies dysuria, hematuria, vomiting, fevers, CP, SOB

## 2019-11-21 NOTE — ED ADULT NURSE REASSESSMENT NOTE - NS ED NURSE REASSESS COMMENT FT1
Patient aware of pending MRI and filled out forms.  Patient is aware he may not eat right now per Dr. Bang.

## 2019-11-21 NOTE — ED ADULT NURSE NOTE - PSH
Acute Appendicitis  s/p appendectomy 5 years ago.  cystoscopy, Ureteral stent    History of appendectomy

## 2019-11-21 NOTE — ED ADULT NURSE REASSESSMENT NOTE - NS ED NURSE REASSESS COMMENT FT1
Patient awake and alert says pain is better than when he arrived.  Patient resting in stretcher, call bell at bedside.

## 2019-11-21 NOTE — ED PROVIDER NOTE - CLINICAL SUMMARY MEDICAL DECISION MAKING FREE TEXT BOX
jes 12 plus hrs of mod-severe non intermitt epi pain ho biliary colic, ho appendectomy, ruq pain no murphys , no scleral icterus - afebrile pocus to eval for biliary path -- analgesia and labs to eval for transaminitis and pancreatic etiology, and reeval

## 2019-11-21 NOTE — H&P ADULT - HISTORY OF PRESENT ILLNESS
GENERAL SURGERY H&P NOTE    HPI:   52yo M w/ PMH/o Nephrolithiasis s/p stent & cystoscopy, and s/p Appy presents w/ severe RUQ pain that awoke him from sleep and didn't get better.  Pt notes some but not complete relief of RUQ pain after morphine this am.  Pt notes occasion pain on & off over last few months but never as bad and quickly improved w/o n/v.   Pt eating as usual. About 6mo pt decided to eat healthier, more produce and less snacking.  He lost about 10pounds.  Pt denies nausea, vomiting, diarrhea.  His bowel habits unchanged.  No cp/palp/ sob/ dyspnea, no f/c/s.  This pain is different from that of when he's had kidney stones and drinks lots of water QD to help prevent repeat problems.      VITALS  T(C): 36.9 (11-21-19 @ 12:02), Max: 36.9 (11-21-19 @ 07:14)  HR: 68 (11-21-19 @ 12:02) (68 - 85)  BP: 113/69 (11-21-19 @ 12:02) (113/69 - 183/99)  RR: 16 (11-21-19 @ 12:02) (16 - 18)  SpO2: 98% (11-21-19 @ 12:02) (98% - 100%)  I&O's Detail GENERAL SURGERY H&P NOTE    HPI:   52yo M w/ PMH/o Nephrolithiasis s/p stent & cystoscopy, and s/p appy presents w/ severe RUQ pain that awoke him from sleep and didn't get better.  Pt notes some but not complete relief of RUQ pain after morphine this am.  Pt notes occasion pain on & off over last few months but never as bad and quickly improved w/o n/v.   Pt eating as usual. About 6mo pt decided to eat healthier, more produce and less snacking.  He lost about 10pounds.  Pt denies nausea, vomiting, diarrhea.  His bowel habits unchanged.  No cp/palp/ sob/ dyspnea, no f/c/s.  This pain is different from that of when he's had kidney stones and drinks lots of water QD to help prevent repeat problems.      VITALS  T(C): 36.9 (11-21-19 @ 12:02), Max: 36.9 (11-21-19 @ 07:14)  HR: 68 (11-21-19 @ 12:02) (68 - 85)  BP: 113/69 (11-21-19 @ 12:02) (113/69 - 183/99)  RR: 16 (11-21-19 @ 12:02) (16 - 18)  SpO2: 98% (11-21-19 @ 12:02) (98% - 100%)  I&O's Detail

## 2019-11-21 NOTE — H&P ADULT - NSHPLABSRESULTS_GEN_ALL_CORE
LABS:                        14.5   6.49  )-----------( 209      ( 21 Nov 2019 08:14 )             42.2     11-21    138  |  101  |  13  ----------------------------<  151<H>  3.8   |  25  |  1.04    Ca    9.1      21 Nov 2019 08:14    TPro  7.8  /  Alb  4.4  /  TBili  0.2  /  DBili  x   /  AST  30  /  ALT  46<H>  /  AlkPhos  79  11-21      PT/INR - ( 21 Nov 2019 08:14 )   PT: 14.3 sec;   INR: 1.25 ratio     PTT - ( 21 Nov 2019 08:14 )  PTT:30.8 sec    < from: US Abdomen Limited (ED) (11.21.19 @ 08:32) >    EXAM:  ER US ABDOMEN LTD      ORDER COMMENTS:      PROCEDURE DATE:  11/21/2019    FOCUSED ED ULTRASOUND REPORT          INTERPRETATION:  Grayscale imaging of the right upper quadrant was   performed.    The gallbladder was visualized and scanned in longitudinal and transverse   planes.  The anterior gallbladder wall measured  .39cm.  there is a dilated nonvascular area which likely represents a dilated    common bile duct measuring 1.4cm however cannot exclude that this is not   neck of gall bladder  multiple large stones in neck of gall bladder  Sonographic Hanson's sign not present.    IMPRESSION:Cholelithiasis with likely dilated bile duct, further imaging   needed

## 2019-11-21 NOTE — H&P ADULT - ASSESSMENT
A/P: 54yo M w/ PMH/o Nephrolithiasis and s/p Appy presents w/ Cholelithiasis, possible cholecystitis     Admit to ACS team  1. NPO/ IVF  2. Strict Intake and Output.  3. Cefotetan  4. Monitor BMP, LFTs, CBC  5. RUQ sono and MRCP  6. Possible GI Consult based on MRCP/ US  7. Serial abdominal exams  D/w ED resident     SURGERY, pager [phone]  D/w Dr Gilliam

## 2019-11-21 NOTE — PATIENT PROFILE ADULT - NSPROPTRIGHTREPPHONE_GEN_A_NUR
----- Message from Nayan Menchaca [phone]   [phone]

## 2019-11-21 NOTE — ED ADULT NURSE REASSESSMENT NOTE - NS ED NURSE REASSESS COMMENT FT1
Patient updated on plan and that he is pending an MRI which is scheduled for 0100.  Patient aware and is filling out forms for MRI.  Patient aware he will likely be admitted.

## 2019-11-21 NOTE — ED PROVIDER NOTE - PROGRESS NOTE DETAILS
pain crosses midline - highly unlikely for renal colic despite history of renal stones , no hydro on pocus -- pocus reveals multiple stones and what appears to be a significantly dilated cbd -- will consult gi and surg - pain improved after morphine -- awaiting surg and gi eval - Loi Weinstein PGY2  Surgery evaluated patient, will admit to dr. swenson for possible cholecystitis. GI recommends MRCP prior to consult.

## 2019-11-22 LAB
ALBUMIN SERPL ELPH-MCNC: 3.9 G/DL — SIGNIFICANT CHANGE UP (ref 3.3–5)
ALP SERPL-CCNC: 72 U/L — SIGNIFICANT CHANGE UP (ref 40–120)
ALT FLD-CCNC: 39 U/L — SIGNIFICANT CHANGE UP (ref 10–45)
ANION GAP SERPL CALC-SCNC: 14 MMOL/L — SIGNIFICANT CHANGE UP (ref 5–17)
AST SERPL-CCNC: 25 U/L — SIGNIFICANT CHANGE UP (ref 10–40)
BASOPHILS # BLD AUTO: 0.04 K/UL — SIGNIFICANT CHANGE UP (ref 0–0.2)
BASOPHILS NFR BLD AUTO: 0.6 % — SIGNIFICANT CHANGE UP (ref 0–2)
BILIRUB SERPL-MCNC: 0.5 MG/DL — SIGNIFICANT CHANGE UP (ref 0.2–1.2)
BLD GP AB SCN SERPL QL: NEGATIVE — SIGNIFICANT CHANGE UP
BUN SERPL-MCNC: 10 MG/DL — SIGNIFICANT CHANGE UP (ref 7–23)
CALCIUM SERPL-MCNC: 8.7 MG/DL — SIGNIFICANT CHANGE UP (ref 8.4–10.5)
CHLORIDE SERPL-SCNC: 100 MMOL/L — SIGNIFICANT CHANGE UP (ref 96–108)
CO2 SERPL-SCNC: 23 MMOL/L — SIGNIFICANT CHANGE UP (ref 22–31)
CREAT SERPL-MCNC: 1.01 MG/DL — SIGNIFICANT CHANGE UP (ref 0.5–1.3)
EOSINOPHIL # BLD AUTO: 0.13 K/UL — SIGNIFICANT CHANGE UP (ref 0–0.5)
EOSINOPHIL NFR BLD AUTO: 1.9 % — SIGNIFICANT CHANGE UP (ref 0–6)
GLUCOSE SERPL-MCNC: 124 MG/DL — HIGH (ref 70–99)
HBA1C BLD-MCNC: 6 % — HIGH (ref 4–5.6)
HCT VFR BLD CALC: 40.1 % — SIGNIFICANT CHANGE UP (ref 39–50)
HCV AB S/CO SERPL IA: 0.19 S/CO — SIGNIFICANT CHANGE UP (ref 0–0.99)
HCV AB SERPL-IMP: SIGNIFICANT CHANGE UP
HGB BLD-MCNC: 13.9 G/DL — SIGNIFICANT CHANGE UP (ref 13–17)
IMM GRANULOCYTES NFR BLD AUTO: 0.3 % — SIGNIFICANT CHANGE UP (ref 0–1.5)
LYMPHOCYTES # BLD AUTO: 1.93 K/UL — SIGNIFICANT CHANGE UP (ref 1–3.3)
LYMPHOCYTES # BLD AUTO: 28.3 % — SIGNIFICANT CHANGE UP (ref 13–44)
MCHC RBC-ENTMCNC: 30.3 PG — SIGNIFICANT CHANGE UP (ref 27–34)
MCHC RBC-ENTMCNC: 34.7 GM/DL — SIGNIFICANT CHANGE UP (ref 32–36)
MCV RBC AUTO: 87.4 FL — SIGNIFICANT CHANGE UP (ref 80–100)
MONOCYTES # BLD AUTO: 0.51 K/UL — SIGNIFICANT CHANGE UP (ref 0–0.9)
MONOCYTES NFR BLD AUTO: 7.5 % — SIGNIFICANT CHANGE UP (ref 2–14)
NEUTROPHILS # BLD AUTO: 4.18 K/UL — SIGNIFICANT CHANGE UP (ref 1.8–7.4)
NEUTROPHILS NFR BLD AUTO: 61.4 % — SIGNIFICANT CHANGE UP (ref 43–77)
NRBC # BLD: 0 /100 WBCS — SIGNIFICANT CHANGE UP (ref 0–0)
PLATELET # BLD AUTO: 205 K/UL — SIGNIFICANT CHANGE UP (ref 150–400)
POTASSIUM SERPL-MCNC: 3.7 MMOL/L — SIGNIFICANT CHANGE UP (ref 3.5–5.3)
POTASSIUM SERPL-SCNC: 3.7 MMOL/L — SIGNIFICANT CHANGE UP (ref 3.5–5.3)
PROT SERPL-MCNC: 7.1 G/DL — SIGNIFICANT CHANGE UP (ref 6–8.3)
RBC # BLD: 4.59 M/UL — SIGNIFICANT CHANGE UP (ref 4.2–5.8)
RBC # FLD: 13.7 % — SIGNIFICANT CHANGE UP (ref 10.3–14.5)
RH IG SCN BLD-IMP: NEGATIVE — SIGNIFICANT CHANGE UP
SODIUM SERPL-SCNC: 137 MMOL/L — SIGNIFICANT CHANGE UP (ref 135–145)
WBC # BLD: 6.81 K/UL — SIGNIFICANT CHANGE UP (ref 3.8–10.5)
WBC # FLD AUTO: 6.81 K/UL — SIGNIFICANT CHANGE UP (ref 3.8–10.5)

## 2019-11-22 PROCEDURE — 99231 SBSQ HOSP IP/OBS SF/LOW 25: CPT

## 2019-11-22 RX ADMIN — Medication 100 GRAM(S): at 00:52

## 2019-11-22 RX ADMIN — Medication 100 GRAM(S): at 11:25

## 2019-11-22 RX ADMIN — HEPARIN SODIUM 5000 UNIT(S): 5000 INJECTION INTRAVENOUS; SUBCUTANEOUS at 21:32

## 2019-11-22 RX ADMIN — HEPARIN SODIUM 5000 UNIT(S): 5000 INJECTION INTRAVENOUS; SUBCUTANEOUS at 05:14

## 2019-11-22 NOTE — PROGRESS NOTE ADULT - ATTENDING COMMENTS
Patient seen and examined on AM rounds  Patient admitted with RUQ abdominal pain on 11/21/19  Now states that pain is completely gone  vitals stable  non-toxic appearing  abdominal exam benign    LFTs / WBC = WNL  MRI / Ultrasound reports reviewed    - Likely biliary colic versus resolving acute cholecystitis  - Risks / benefits / alternatives to laparoscopic, possible open, cholecystectomy discussed with patient at length.    - I have offered the patient cholecystectomy on this admission (likely this afternoon).  I have discussed likely recurrent symptoms without cholecystectomy.  The patient is requesting trial of PO diet, and if no further pain, wants to be discharged with follow up in my office for elective cholecystectomy in the future.  - Will allow regular diet, and it no further symptoms, will discharge home.

## 2019-11-22 NOTE — PROGRESS NOTE ADULT - SUBJECTIVE AND OBJECTIVE BOX
SUBJECTIVE: Pt seen and examined at bedside with chief resident. Pt endorses, mild abdominal pain. Denies N/V, chest pain, SOB.      MEDICATIONS  (STANDING):  cefoTEtan  IVPB 1 Gram(s) IV Intermittent every 12 hours  dextrose 5% + sodium chloride 0.45% 1000 milliLiter(s) (100 mL/Hr) IV Continuous <Continuous>  heparin  Injectable 5000 Unit(s) SubCutaneous every 12 hours  influenza   Vaccine 0.5 milliLiter(s) IntraMuscular once    MEDICATIONS  (PRN):      OBJECTIVE:    Vital Signs Last 24 Hrs  T(C): 36.7 (2019 08:56), Max: 36.9 (2019 12:02)  T(F): 98.1 (2019 08:56), Max: 98.5 (2019 12:02)  HR: 68 (2019 08:56) (62 - 70)  BP: 108/69 (2019 08:56) (103/63 - 127/78)  BP(mean): --  RR: 18 (2019 08:56) (16 - 18)  SpO2: 96% (2019 08:56) (96% - 99%)    General Appearance: Resting comfortably, no acute distress  Chest: non-labored breathing, no respiratory distress  CV: Pulse regular presently  Abdomen: Soft, TTP RUQ, non-distended, no rebound tenderness or guarding  Extremities: warm and well perfused    I&O's Summary    2019 07:  -  2019 07:00  --------------------------------------------------------  IN: 1350 mL / OUT: 300 mL / NET: 1050 mL      I&O's Detail    2019 07:  -  2019 07:00  --------------------------------------------------------  IN:    dextrose 5% + sodium chloride 0.45%: 1300 mL    IV PiggyBack: 50 mL  Total IN: 1350 mL    OUT:    Voided: 300 mL  Total OUT: 300 mL    Total NET: 1050 mL            LABS:                        13.9   6.81  )-----------( 205      ( 2019 06:38 )             40.1         137  |  100  |  10  ----------------------------<  124<H>  3.7   |  23  |  1.01    Ca    8.7      2019 06:38    TPro  7.1  /  Alb  3.9  /  TBili  0.5  /  DBili  x   /  AST  25  /  ALT  39  /  AlkPhos  72      PT/INR - ( 2019 08:14 )   PT: 14.3 sec;   INR: 1.25 ratio         PTT - ( 2019 08:14 )  PTT:30.8 sec  Urinalysis Basic - ( 2019 10:50 )    Color: Light Yellow / Appearance: Clear / S.017 / pH: x  Gluc: x / Ketone: Negative  / Bili: Negative / Urobili: Negative   Blood: x / Protein: Negative / Nitrite: Negative   Leuk Esterase: Negative / RBC: 1 /hpf / WBC 0 /HPF   Sq Epi: x / Non Sq Epi: 0 /hpf / Bacteria: 0.0        RADIOLOGY & ADDITIONAL STUDIES:  < from: MR MRCP w/wo IV Cont (19 @ 22:35) >  EXAM:  MR MRCP WAW IC                            PROCEDURE DATE:  2019            INTERPRETATION:  CLINICAL INFORMATION: Abdominal pain.    COMPARISON: Abdominal sonography 2019, CT abdomen pelvis 2014.    PROCEDURE:   MRI of the abdomen was performed with and without intravenous contrast.  IV Contrast: Gadavist. 10 cc administered, 0 cc discarded.  MRCP was performed.    FINDINGS:    LOWER CHEST: Within normal limits.    LIVER: Normal morphology. Mild steatosis.   BILE DUCTS: Normal caliber. No choledocholithiasis or abnormal biliary   enhancement.  GALLBLADDER: Distended with cholelithiasis and nonspecific wall edema. No   significant pericholecystic inflammation or collection.   SPLEEN: Within normal limits.  PANCREAS: Normal signal and enhancement. No focal lesion or ductal   dilatation. No peripancreatic inflammation.  ADRENALS: Within normal limits.  KIDNEYS/URETERS: Small right renal cyst.    VISUALIZED PORTIONS:    BOWEL: Within normal limits.   PERITONEUM: No ascites.  VESSELS: Hepatic and portal veins are patent.  RETROPERITONEUM/LYMPH NODES: No lymphadenopathy.    ABDOMINAL WALL: Within normal limits.  BONES: Within normal limits.    IMPRESSION:     Distended gallbladder with stones and nonspecific wall edema. No   significant pericholecystic inflammation.    No biliary ductal dilatation. No choledocholithiasis or abnormal biliary   enhancement.     Mild hepatic steatosis.    < end of copied text >

## 2019-11-22 NOTE — CHART NOTE - NSCHARTNOTEFT_GEN_A_CORE
PRE OPERATIVE NOTE    Pre-op Diagnosis: Acute cholecystitis  Procedure: Laparoscopic cholecystectomy   Surgeon: Dr. Alejandre, Dr. Sahu                          13.9   6.81  )-----------( 205      ( 22 Nov 2019 06:38 )             40.1     11-22    137  |  100  |  10  ----------------------------<  124<H>  3.7   |  23  |  1.01    Ca    8.7      22 Nov 2019 06:38    TPro  7.1  /  Alb  3.9  /  TBili  0.5  /  DBili  x   /  AST  25  /  ALT  39  /  AlkPhos  72  11-22    LIVER FUNCTIONS - ( 22 Nov 2019 06:38 )  Alb: 3.9 g/dL / Pro: 7.1 g/dL / ALK PHOS: 72 U/L / ALT: 39 U/L / AST: 25 U/L / GGT: x           PT/INR - ( 21 Nov 2019 08:14 )   PT: 14.3 sec;   INR: 1.25 ratio         PTT - ( 21 Nov 2019 08:14 )  PTT:30.8 sec    CAPILLARY BLOOD GLUCOSE          Type & Screen: Type + Screen (11.21.19 @ 08:19)    ABO Interpretation: A    Rh Interpretation: Negative    Antibody Screen: Negative    CXR:IMPRESSION:  No active pulmonary disease.    EKG: Diagnosis Line NORMAL SINUS RHYTHM  NORMAL ECG    A/P: 53y Male planned for above procedure  NPO past midnight, except medications  IVF  Pain/nausea control  AM labs  Consent in chart  cefoTEtan  IVPB

## 2019-11-22 NOTE — PROGRESS NOTE ADULT - ASSESSMENT
52 y/o M w/ PMH nephrolithiasis and appendectomy presents w/ RUQ and gallstones on imaging and gallbladder wall edema, likely acute cholecystitis. MRCP shows distended gallbladder w/ stones and wall edema w/ no significant pericholecystic inflammation or biliary ductal dilation.    Plan:  - NPO/IVF  - Abx: cefotetan  - Monitor BMP, LFTs, CBC  - Serial abdominal exams  - Consent for lap cholecystectomy tomorrow 52 y/o M w/ PMH nephrolithiasis and appendectomy presents w/ RUQ and gallstones on imaging and gallbladder wall edema, likely acute cholecystitis. MRCP shows distended gallbladder w/ stones and wall edema w/ no significant pericholecystic inflammation or biliary ductal dilation.    Plan:  - OR tomorrow for lap cholecystectomy, needs consent, preop. Booked  - CLD and then NPO after midnight  - Abx: cefotetan  - Monitor BMP, LFTs, CBC  - Serial abdominal exams

## 2019-11-23 ENCOUNTER — TRANSCRIPTION ENCOUNTER (OUTPATIENT)
Age: 54
End: 2019-11-23

## 2019-11-23 VITALS
OXYGEN SATURATION: 95 % | TEMPERATURE: 98 F | RESPIRATION RATE: 16 BRPM | HEART RATE: 69 BPM | DIASTOLIC BLOOD PRESSURE: 79 MMHG | SYSTOLIC BLOOD PRESSURE: 120 MMHG

## 2019-11-23 LAB
ALBUMIN SERPL ELPH-MCNC: 3.8 G/DL — SIGNIFICANT CHANGE UP (ref 3.3–5)
ALP SERPL-CCNC: 72 U/L — SIGNIFICANT CHANGE UP (ref 40–120)
ALT FLD-CCNC: 36 U/L — SIGNIFICANT CHANGE UP (ref 10–45)
ANION GAP SERPL CALC-SCNC: 9 MMOL/L — SIGNIFICANT CHANGE UP (ref 5–17)
AST SERPL-CCNC: 21 U/L — SIGNIFICANT CHANGE UP (ref 10–40)
BILIRUB SERPL-MCNC: 0.4 MG/DL — SIGNIFICANT CHANGE UP (ref 0.2–1.2)
BUN SERPL-MCNC: 7 MG/DL — SIGNIFICANT CHANGE UP (ref 7–23)
CALCIUM SERPL-MCNC: 9 MG/DL — SIGNIFICANT CHANGE UP (ref 8.4–10.5)
CHLORIDE SERPL-SCNC: 106 MMOL/L — SIGNIFICANT CHANGE UP (ref 96–108)
CO2 SERPL-SCNC: 25 MMOL/L — SIGNIFICANT CHANGE UP (ref 22–31)
CREAT SERPL-MCNC: 0.99 MG/DL — SIGNIFICANT CHANGE UP (ref 0.5–1.3)
GLUCOSE SERPL-MCNC: 121 MG/DL — HIGH (ref 70–99)
HCT VFR BLD CALC: 42 % — SIGNIFICANT CHANGE UP (ref 39–50)
HGB BLD-MCNC: 14 G/DL — SIGNIFICANT CHANGE UP (ref 13–17)
MAGNESIUM SERPL-MCNC: 2.1 MG/DL — SIGNIFICANT CHANGE UP (ref 1.6–2.6)
MCHC RBC-ENTMCNC: 28.8 PG — SIGNIFICANT CHANGE UP (ref 27–34)
MCHC RBC-ENTMCNC: 33.3 GM/DL — SIGNIFICANT CHANGE UP (ref 32–36)
MCV RBC AUTO: 86.4 FL — SIGNIFICANT CHANGE UP (ref 80–100)
NRBC # BLD: 0 /100 WBCS — SIGNIFICANT CHANGE UP (ref 0–0)
PHOSPHATE SERPL-MCNC: 2.4 MG/DL — LOW (ref 2.5–4.5)
PLATELET # BLD AUTO: 217 K/UL — SIGNIFICANT CHANGE UP (ref 150–400)
POTASSIUM SERPL-MCNC: 3.8 MMOL/L — SIGNIFICANT CHANGE UP (ref 3.5–5.3)
POTASSIUM SERPL-SCNC: 3.8 MMOL/L — SIGNIFICANT CHANGE UP (ref 3.5–5.3)
PROT SERPL-MCNC: 7 G/DL — SIGNIFICANT CHANGE UP (ref 6–8.3)
RBC # BLD: 4.86 M/UL — SIGNIFICANT CHANGE UP (ref 4.2–5.8)
RBC # FLD: 13.2 % — SIGNIFICANT CHANGE UP (ref 10.3–14.5)
SODIUM SERPL-SCNC: 140 MMOL/L — SIGNIFICANT CHANGE UP (ref 135–145)
WBC # BLD: 6.97 K/UL — SIGNIFICANT CHANGE UP (ref 3.8–10.5)
WBC # FLD AUTO: 6.97 K/UL — SIGNIFICANT CHANGE UP (ref 3.8–10.5)

## 2019-11-23 PROCEDURE — G0378: CPT

## 2019-11-23 PROCEDURE — 84100 ASSAY OF PHOSPHORUS: CPT

## 2019-11-23 PROCEDURE — 81001 URINALYSIS AUTO W/SCOPE: CPT

## 2019-11-23 PROCEDURE — 86901 BLOOD TYPING SEROLOGIC RH(D): CPT

## 2019-11-23 PROCEDURE — 99231 SBSQ HOSP IP/OBS SF/LOW 25: CPT

## 2019-11-23 PROCEDURE — 74183 MRI ABD W/O CNTR FLWD CNTR: CPT

## 2019-11-23 PROCEDURE — 83036 HEMOGLOBIN GLYCOSYLATED A1C: CPT

## 2019-11-23 PROCEDURE — 83690 ASSAY OF LIPASE: CPT

## 2019-11-23 PROCEDURE — 71045 X-RAY EXAM CHEST 1 VIEW: CPT

## 2019-11-23 PROCEDURE — 86803 HEPATITIS C AB TEST: CPT

## 2019-11-23 PROCEDURE — 96374 THER/PROPH/DIAG INJ IV PUSH: CPT

## 2019-11-23 PROCEDURE — 99285 EMERGENCY DEPT VISIT HI MDM: CPT | Mod: 25

## 2019-11-23 PROCEDURE — 83735 ASSAY OF MAGNESIUM: CPT

## 2019-11-23 PROCEDURE — 80053 COMPREHEN METABOLIC PANEL: CPT

## 2019-11-23 PROCEDURE — 86850 RBC ANTIBODY SCREEN: CPT

## 2019-11-23 PROCEDURE — 85730 THROMBOPLASTIN TIME PARTIAL: CPT

## 2019-11-23 PROCEDURE — A9585: CPT

## 2019-11-23 PROCEDURE — 85027 COMPLETE CBC AUTOMATED: CPT

## 2019-11-23 PROCEDURE — 93005 ELECTROCARDIOGRAM TRACING: CPT

## 2019-11-23 PROCEDURE — 86900 BLOOD TYPING SEROLOGIC ABO: CPT

## 2019-11-23 PROCEDURE — 96375 TX/PRO/DX INJ NEW DRUG ADDON: CPT

## 2019-11-23 PROCEDURE — 76705 ECHO EXAM OF ABDOMEN: CPT

## 2019-11-23 PROCEDURE — 85610 PROTHROMBIN TIME: CPT

## 2019-11-23 PROCEDURE — 90686 IIV4 VACC NO PRSV 0.5 ML IM: CPT

## 2019-11-23 RX ADMIN — INFLUENZA VIRUS VACCINE 0.5 MILLILITER(S): 15; 15; 15; 15 SUSPENSION INTRAMUSCULAR at 12:56

## 2019-11-23 RX ADMIN — Medication 100 GRAM(S): at 00:05

## 2019-11-23 RX ADMIN — HEPARIN SODIUM 5000 UNIT(S): 5000 INJECTION INTRAVENOUS; SUBCUTANEOUS at 09:18

## 2019-11-23 NOTE — DISCHARGE NOTE NURSING/CASE MANAGEMENT/SOCIAL WORK - PATIENT PORTAL LINK FT
You can access the FollowMyHealth Patient Portal offered by [facility] by registering at the following website: [email or web address]. By joining [employer]’s FollowMyHealth portal, you will also be able to view your health information using other applications (apps) compatible with our system.

## 2019-11-23 NOTE — PROGRESS NOTE ADULT - ASSESSMENT
52 y/o M w/ PMH nephrolithiasis and appendectomy presents w/ RUQ and gallstones on imaging and gallbladder wall edema, likely acute cholecystitis. MRCP shows distended gallbladder w/ stones and wall edema w/ no significant pericholecystic inflammation or biliary ductal dilation. Improving clinically.    Plan:  - CLD trail this morning, if tolerating then D/C home this afternoon for outpatient follow-up with Dr. Alejandre to schedule Lap cholecystectomy.  - Serial abdominal exams    ACS & Trauma surgery,  [id number]

## 2019-11-23 NOTE — DISCHARGE NOTE PROVIDER - NSDCCPCAREPLAN_GEN_ALL_CORE_FT
PRINCIPAL DISCHARGE DIAGNOSIS  Diagnosis: Epigastric abdominal pain  Assessment and Plan of Treatment:

## 2019-11-23 NOTE — DISCHARGE NOTE PROVIDER - CARE PROVIDER_API CALL
Cole Alejandre)  Surgery; Surgical Critical Care  [address] [place] [address]  Phone: [phone]  Fax: [phone]  Follow Up Time: 2 weeks

## 2019-11-23 NOTE — DISCHARGE NOTE PROVIDER - NSDCFUADDAPPT_GEN_ALL_CORE_FT
Please follow up with Dr. Alejandre within 1 week after discharge from the hospital. You may call [phone] to schedule an appointment.     Please followup with your Primary Care Physician within one to two weeks to update your medical records regarding this hospitalization and to review all your current medications and dosages.  Call their office for appointment.

## 2019-11-23 NOTE — DISCHARGE NOTE NURSING/CASE MANAGEMENT/SOCIAL WORK - NSDCFUADDAPPT_GEN_ALL_CORE_FT
Please follow up with Dr. Alejandre within 1 week after discharge from the hospital. You may call [phone] to schedule an appointment.     Please followup with your Primary Care Physician within one to two weeks to update your medical records regarding this hospitalization and to review all your current medications and dosages.  Call their office for appointment.

## 2019-11-23 NOTE — PROGRESS NOTE ADULT - SUBJECTIVE AND OBJECTIVE BOX
TRAUMA SURGERY DAILY PROGRESS NOTE:    Overnight:  No acute events.    Subjective:  Patient reports pain is well controlled. Denies N/V.      Vital Signs Last 24 Hrs  T(C): 36.8 (23 Nov 2019 09:07), Max: 37.1 (22 Nov 2019 21:48)  T(F): 98.3 (23 Nov 2019 09:07), Max: 98.8 (22 Nov 2019 21:48)  HR: 80 (23 Nov 2019 09:07) (60 - 80)  BP: 116/66 (23 Nov 2019 09:07) (114/70 - 134/76)  BP(mean): --  RR: 18 (23 Nov 2019 09:07) (18 - 18)  SpO2: 95% (23 Nov 2019 09:07) (95% - 99%)    Exam:  Gen: NAD, resting in bed  Resp: Airway patent, non-labored respirations  Abd: Soft, ND, NT, no rebound or guarding.   Ext: No edema, WWP  Neuro: AAOx3, no focal deficits    LABS:                        14.0   6.97  )-----------( 217      ( 23 Nov 2019 04:31 )             42.0     11-23    140  |  106  |  7   ----------------------------<  121<H>  3.8   |  25  |  0.99    Ca    9.0      23 Nov 2019 04:31  Phos  2.4     11-23  Mg     2.1     11-23    TPro  7.0  /  Alb  3.8  /  TBili  0.4  /  DBili  x   /  AST  21  /  ALT  36  /  AlkPhos  72  11-23

## 2019-11-23 NOTE — DISCHARGE NOTE PROVIDER - HOSPITAL COURSE
Pt presented with RUQ pain which woke him from sleep on 11/21/19. He had similar episodes in the past but never this severe and never lasting so long, which is why he sought medical attention. Initial US was unclear regarding size of CBD, so repeat US was ordered and MRCP was ordered. Repeat US showed normal CBD and multiple mobile gallstones with possible wall thickening. MRCP done on 11/21/19 revealed distended gallbladder with stones and nonspecific wall edema. No     significant pericholecystic inflammation.        No biliary ductal dilatation. No choledocholithiasis or abnormal biliary     enhancement.         < end of copied text >                A/P 54 y/o man with RUQ pain, gallstones on imaging, likely acute cholecystitis given ongoing pain and tenderness: Pt presented with RUQ pain which woke him from sleep on 11/21/19. He had similar episodes in the past but never this severe and never lasting so long, which is why he sought medical attention. Initial US was unclear regarding size of CBD, so repeat US was ordered and MRCP was ordered. Repeat US showed normal CBD and multiple mobile gallstones with possible wall thickening. MRCP done on 11/21/19 revealed distended gallbladder with stones and nonspecific wall edema. No significant pericholecystic inflammation. No biliary ductal dilatation. No choledocholithiasis or abnormal biliary enhancement. On 11/23/19 Patient did not report any pain and was given a PO trial and was deemed stable to discharge. At the time of discharge, the patient was hemodynamically stable, was tolerating PO diet, was voiding urine and passing stool, was ambulating, and was comfortable with adequate pain control.

## 2019-11-27 NOTE — DISCUSSION/SUMMARY
[FreeTextEntry1] : attempted to reach pt multiple times, will mail out post discharge f/u letter. pcp notified

## 2019-12-30 ENCOUNTER — APPOINTMENT (OUTPATIENT)
Dept: INTERNAL MEDICINE | Facility: CLINIC | Age: 54
End: 2019-12-30

## 2020-01-04 ENCOUNTER — TRANSCRIPTION ENCOUNTER (OUTPATIENT)
Age: 55
End: 2020-01-04

## 2020-01-05 ENCOUNTER — INPATIENT (INPATIENT)
Facility: HOSPITAL | Age: 55
LOS: 3 days | Discharge: HOME | DRG: 417 | End: 2020-01-09
Attending: SURGERY | Admitting: SURGERY
Payer: MEDICAID

## 2020-01-05 ENCOUNTER — RESULT REVIEW (OUTPATIENT)
Age: 55
End: 2020-01-05

## 2020-01-05 VITALS
TEMPERATURE: 98 F | DIASTOLIC BLOOD PRESSURE: 91 MMHG | SYSTOLIC BLOOD PRESSURE: 151 MMHG | HEIGHT: 70 IN | WEIGHT: 195.11 LBS | OXYGEN SATURATION: 99 % | HEART RATE: 66 BPM | RESPIRATION RATE: 18 BRPM

## 2020-01-05 DIAGNOSIS — K80.20 CALCULUS OF GALLBLADDER WITHOUT CHOLECYSTITIS WITHOUT OBSTRUCTION: ICD-10-CM

## 2020-01-05 DIAGNOSIS — Z90.49 ACQUIRED ABSENCE OF OTHER SPECIFIED PARTS OF DIGESTIVE TRACT: Chronic | ICD-10-CM

## 2020-01-05 LAB
ALBUMIN SERPL ELPH-MCNC: 4.5 G/DL — SIGNIFICANT CHANGE UP (ref 3.3–5)
ALP SERPL-CCNC: 82 U/L — SIGNIFICANT CHANGE UP (ref 40–120)
ALT FLD-CCNC: 41 U/L — SIGNIFICANT CHANGE UP (ref 10–45)
ANION GAP SERPL CALC-SCNC: 7 MMOL/L — SIGNIFICANT CHANGE UP (ref 5–17)
APPEARANCE UR: CLEAR — SIGNIFICANT CHANGE UP
AST SERPL-CCNC: 27 U/L — SIGNIFICANT CHANGE UP (ref 10–40)
BACTERIA # UR AUTO: NEGATIVE — SIGNIFICANT CHANGE UP
BASOPHILS # BLD AUTO: 0.04 K/UL — SIGNIFICANT CHANGE UP (ref 0–0.2)
BASOPHILS NFR BLD AUTO: 0.3 % — SIGNIFICANT CHANGE UP (ref 0–2)
BILIRUB SERPL-MCNC: 0.2 MG/DL — SIGNIFICANT CHANGE UP (ref 0.2–1.2)
BILIRUB UR-MCNC: NEGATIVE — SIGNIFICANT CHANGE UP
BLD GP AB SCN SERPL QL: NEGATIVE — SIGNIFICANT CHANGE UP
BUN SERPL-MCNC: 19 MG/DL — SIGNIFICANT CHANGE UP (ref 7–23)
CALCIUM SERPL-MCNC: 9.3 MG/DL — SIGNIFICANT CHANGE UP (ref 8.4–10.5)
CHLORIDE SERPL-SCNC: 98 MMOL/L — SIGNIFICANT CHANGE UP (ref 96–108)
CO2 SERPL-SCNC: 27 MMOL/L — SIGNIFICANT CHANGE UP (ref 22–31)
COLOR SPEC: SIGNIFICANT CHANGE UP
CREAT SERPL-MCNC: 1.11 MG/DL — SIGNIFICANT CHANGE UP (ref 0.5–1.3)
DIFF PNL FLD: NEGATIVE — SIGNIFICANT CHANGE UP
EOSINOPHIL # BLD AUTO: 0.04 K/UL — SIGNIFICANT CHANGE UP (ref 0–0.5)
EOSINOPHIL NFR BLD AUTO: 0.3 % — SIGNIFICANT CHANGE UP (ref 0–6)
EPI CELLS # UR: 0 /HPF — SIGNIFICANT CHANGE UP
GLUCOSE SERPL-MCNC: 167 MG/DL — HIGH (ref 70–99)
GLUCOSE UR QL: NEGATIVE — SIGNIFICANT CHANGE UP
HCT VFR BLD CALC: 44.4 % — SIGNIFICANT CHANGE UP (ref 39–50)
HGB BLD-MCNC: 14.5 G/DL — SIGNIFICANT CHANGE UP (ref 13–17)
HYALINE CASTS # UR AUTO: 1 /LPF — SIGNIFICANT CHANGE UP (ref 0–2)
IMM GRANULOCYTES NFR BLD AUTO: 0.4 % — SIGNIFICANT CHANGE UP (ref 0–1.5)
KETONES UR-MCNC: NEGATIVE — SIGNIFICANT CHANGE UP
LACTATE BLDV-MCNC: 1.3 MMOL/L — SIGNIFICANT CHANGE UP (ref 0.7–2)
LEUKOCYTE ESTERASE UR-ACNC: NEGATIVE — SIGNIFICANT CHANGE UP
LIDOCAIN IGE QN: 27 U/L — SIGNIFICANT CHANGE UP (ref 7–60)
LYMPHOCYTES # BLD AUTO: 1 K/UL — SIGNIFICANT CHANGE UP (ref 1–3.3)
LYMPHOCYTES # BLD AUTO: 7.9 % — LOW (ref 13–44)
MCHC RBC-ENTMCNC: 29.1 PG — SIGNIFICANT CHANGE UP (ref 27–34)
MCHC RBC-ENTMCNC: 32.7 GM/DL — SIGNIFICANT CHANGE UP (ref 32–36)
MCV RBC AUTO: 89.2 FL — SIGNIFICANT CHANGE UP (ref 80–100)
MONOCYTES # BLD AUTO: 0.45 K/UL — SIGNIFICANT CHANGE UP (ref 0–0.9)
MONOCYTES NFR BLD AUTO: 3.6 % — SIGNIFICANT CHANGE UP (ref 2–14)
NEUTROPHILS # BLD AUTO: 11.04 K/UL — HIGH (ref 1.8–7.4)
NEUTROPHILS NFR BLD AUTO: 87.5 % — HIGH (ref 43–77)
NITRITE UR-MCNC: NEGATIVE — SIGNIFICANT CHANGE UP
NRBC # BLD: 0 /100 WBCS — SIGNIFICANT CHANGE UP (ref 0–0)
PH UR: 6 — SIGNIFICANT CHANGE UP (ref 5–8)
PLATELET # BLD AUTO: 245 K/UL — SIGNIFICANT CHANGE UP (ref 150–400)
POTASSIUM SERPL-MCNC: 4 MMOL/L — SIGNIFICANT CHANGE UP (ref 3.5–5.3)
POTASSIUM SERPL-SCNC: 4 MMOL/L — SIGNIFICANT CHANGE UP (ref 3.5–5.3)
PROT SERPL-MCNC: 8.1 G/DL — SIGNIFICANT CHANGE UP (ref 6–8.3)
PROT UR-MCNC: NEGATIVE — SIGNIFICANT CHANGE UP
RBC # BLD: 4.98 M/UL — SIGNIFICANT CHANGE UP (ref 4.2–5.8)
RBC # FLD: 13.5 % — SIGNIFICANT CHANGE UP (ref 10.3–14.5)
RBC CASTS # UR COMP ASSIST: 0 /HPF — SIGNIFICANT CHANGE UP (ref 0–4)
RH IG SCN BLD-IMP: NEGATIVE — SIGNIFICANT CHANGE UP
SODIUM SERPL-SCNC: 132 MMOL/L — LOW (ref 135–145)
SP GR SPEC: 1.02 — SIGNIFICANT CHANGE UP (ref 1.01–1.02)
UROBILINOGEN FLD QL: NEGATIVE — SIGNIFICANT CHANGE UP
WBC # BLD: 12.62 K/UL — HIGH (ref 3.8–10.5)
WBC # FLD AUTO: 12.62 K/UL — HIGH (ref 3.8–10.5)
WBC UR QL: 1 /HPF — SIGNIFICANT CHANGE UP (ref 0–5)

## 2020-01-05 PROCEDURE — 99284 EMERGENCY DEPT VISIT MOD MDM: CPT

## 2020-01-05 PROCEDURE — 99222 1ST HOSP IP/OBS MODERATE 55: CPT | Mod: 57

## 2020-01-05 PROCEDURE — 76705 ECHO EXAM OF ABDOMEN: CPT | Mod: 26,RT

## 2020-01-05 PROCEDURE — 47563 LAPARO CHOLECYSTECTOMY/GRAPH: CPT | Mod: 22,GC

## 2020-01-05 PROCEDURE — 88304 TISSUE EXAM BY PATHOLOGIST: CPT | Mod: 26

## 2020-01-05 RX ORDER — HYDROMORPHONE HYDROCHLORIDE 2 MG/ML
0.5 INJECTION INTRAMUSCULAR; INTRAVENOUS; SUBCUTANEOUS
Refills: 0 | Status: DISCONTINUED | OUTPATIENT
Start: 2020-01-05 | End: 2020-01-06

## 2020-01-05 RX ORDER — ACETAMINOPHEN 500 MG
1000 TABLET ORAL ONCE
Refills: 0 | Status: COMPLETED | OUTPATIENT
Start: 2020-01-06 | End: 2020-01-06

## 2020-01-05 RX ORDER — KETOROLAC TROMETHAMINE 30 MG/ML
15 SYRINGE (ML) INJECTION ONCE
Refills: 0 | Status: DISCONTINUED | OUTPATIENT
Start: 2020-01-05 | End: 2020-01-05

## 2020-01-05 RX ORDER — SODIUM CHLORIDE 9 MG/ML
1000 INJECTION, SOLUTION INTRAVENOUS
Refills: 0 | Status: DISCONTINUED | OUTPATIENT
Start: 2020-01-05 | End: 2020-01-06

## 2020-01-05 RX ORDER — OXYCODONE HYDROCHLORIDE 5 MG/1
10 TABLET ORAL EVERY 4 HOURS
Refills: 0 | Status: DISCONTINUED | OUTPATIENT
Start: 2020-01-05 | End: 2020-01-08

## 2020-01-05 RX ORDER — ONDANSETRON 8 MG/1
4 TABLET, FILM COATED ORAL ONCE
Refills: 0 | Status: DISCONTINUED | OUTPATIENT
Start: 2020-01-05 | End: 2020-01-06

## 2020-01-05 RX ORDER — SODIUM CHLORIDE 9 MG/ML
1000 INJECTION, SOLUTION INTRAVENOUS
Refills: 0 | Status: DISCONTINUED | OUTPATIENT
Start: 2020-01-05 | End: 2020-01-05

## 2020-01-05 RX ORDER — CEFOTETAN DISODIUM 1 G
1 VIAL (EA) INJECTION ONCE
Refills: 0 | Status: COMPLETED | OUTPATIENT
Start: 2020-01-05 | End: 2020-01-05

## 2020-01-05 RX ORDER — CEFOTETAN DISODIUM 1 G
1 VIAL (EA) INJECTION EVERY 12 HOURS
Refills: 0 | Status: CANCELLED | OUTPATIENT
Start: 2020-01-06 | End: 2020-01-05

## 2020-01-05 RX ORDER — ACETAMINOPHEN 500 MG
975 TABLET ORAL ONCE
Refills: 0 | Status: COMPLETED | OUTPATIENT
Start: 2020-01-05 | End: 2020-01-05

## 2020-01-05 RX ORDER — ONDANSETRON 8 MG/1
4 TABLET, FILM COATED ORAL ONCE
Refills: 0 | Status: COMPLETED | OUTPATIENT
Start: 2020-01-05 | End: 2020-01-05

## 2020-01-05 RX ORDER — OXYCODONE HYDROCHLORIDE 5 MG/1
5 TABLET ORAL EVERY 4 HOURS
Refills: 0 | Status: DISCONTINUED | OUTPATIENT
Start: 2020-01-05 | End: 2020-01-09

## 2020-01-05 RX ORDER — ENOXAPARIN SODIUM 100 MG/ML
40 INJECTION SUBCUTANEOUS EVERY 24 HOURS
Refills: 0 | Status: DISCONTINUED | OUTPATIENT
Start: 2020-01-05 | End: 2020-01-05

## 2020-01-05 RX ORDER — ENOXAPARIN SODIUM 100 MG/ML
40 INJECTION SUBCUTANEOUS DAILY
Refills: 0 | Status: DISCONTINUED | OUTPATIENT
Start: 2020-01-05 | End: 2020-01-07

## 2020-01-05 RX ORDER — SODIUM CHLORIDE 9 MG/ML
1000 INJECTION INTRAMUSCULAR; INTRAVENOUS; SUBCUTANEOUS ONCE
Refills: 0 | Status: COMPLETED | OUTPATIENT
Start: 2020-01-05 | End: 2020-01-05

## 2020-01-05 RX ORDER — ACETAMINOPHEN 500 MG
1000 TABLET ORAL ONCE
Refills: 0 | Status: DISCONTINUED | OUTPATIENT
Start: 2020-01-06 | End: 2020-01-06

## 2020-01-05 RX ORDER — ACETAMINOPHEN 500 MG
1000 TABLET ORAL ONCE
Refills: 0 | Status: DISCONTINUED | OUTPATIENT
Start: 2020-01-05 | End: 2020-01-06

## 2020-01-05 RX ORDER — FAMOTIDINE 10 MG/ML
20 INJECTION INTRAVENOUS ONCE
Refills: 0 | Status: COMPLETED | OUTPATIENT
Start: 2020-01-05 | End: 2020-01-05

## 2020-01-05 RX ORDER — CEFOTETAN DISODIUM 1 G
VIAL (EA) INJECTION
Refills: 0 | Status: DISCONTINUED | OUTPATIENT
Start: 2020-01-05 | End: 2020-01-05

## 2020-01-05 RX ADMIN — Medication 15 MILLIGRAM(S): at 11:50

## 2020-01-05 RX ADMIN — ONDANSETRON 4 MILLIGRAM(S): 8 TABLET, FILM COATED ORAL at 11:50

## 2020-01-05 RX ADMIN — SODIUM CHLORIDE 1000 MILLILITER(S): 9 INJECTION INTRAMUSCULAR; INTRAVENOUS; SUBCUTANEOUS at 09:17

## 2020-01-05 RX ADMIN — FAMOTIDINE 20 MILLIGRAM(S): 10 INJECTION INTRAVENOUS at 09:18

## 2020-01-05 RX ADMIN — Medication 30 MILLILITER(S): at 09:18

## 2020-01-05 RX ADMIN — SODIUM CHLORIDE 50 MILLILITER(S): 9 INJECTION, SOLUTION INTRAVENOUS at 21:00

## 2020-01-05 RX ADMIN — SODIUM CHLORIDE 100 MILLILITER(S): 9 INJECTION, SOLUTION INTRAVENOUS at 13:45

## 2020-01-05 RX ADMIN — Medication 15 MILLIGRAM(S): at 12:20

## 2020-01-05 RX ADMIN — HYDROMORPHONE HYDROCHLORIDE 0.5 MILLIGRAM(S): 2 INJECTION INTRAMUSCULAR; INTRAVENOUS; SUBCUTANEOUS at 21:40

## 2020-01-05 RX ADMIN — ENOXAPARIN SODIUM 40 MILLIGRAM(S): 100 INJECTION SUBCUTANEOUS at 13:42

## 2020-01-05 RX ADMIN — Medication 975 MILLIGRAM(S): at 10:54

## 2020-01-05 RX ADMIN — HYDROMORPHONE HYDROCHLORIDE 0.5 MILLIGRAM(S): 2 INJECTION INTRAMUSCULAR; INTRAVENOUS; SUBCUTANEOUS at 22:10

## 2020-01-05 RX ADMIN — Medication 100 GRAM(S): at 13:44

## 2020-01-05 RX ADMIN — Medication 975 MILLIGRAM(S): at 09:18

## 2020-01-05 NOTE — ED PROVIDER NOTE - PRO INTERPRETER NEED 2
Anesthesia information    Anesthesia Safety      You have been given medicine  to sedate you during your procedure today. This may have included both a pain medicine and sleeping medicine. Most of the effects have worn off; however, you may continue to have some drowsiness for the next  24 hours. Anesthesia and pain medicines can cause nausea, sleepiness, dizziness and  constipation.    HOME CARE:  1) For the next EIGHT HOURS, you should be watched by a responsible adult to look for any worsening of your condition.  2) DO NOT DRINK any ALCOHOL for the next 24 HOURS.  3) DO NOT DRIVE or operate dangerous machinery during the next 24 HOURS.  FOLLOW UP with your doctor or this facility if you are not alert and back to your usual level of activity within 24 hrs.  GET PROMPT MEDICAL ATTENTION if any of the following occur:  -- Increased drowsiness  -- Increased weakness or dizziness  -- Repeated vomiting  -- If you cannot be awakened    Pain injection instructions:     Steroids take about a week to relieve pain.  Initially you may get pain relief from the local anesthetic but this may wear off before the steroid works.    No driving for 24 hrs   Activity as tolerated- gradually increase activities.  Dont lift over 10 lbs for 24 hrs   No heat at injection sites x 2 days  Use ice for mild swelling and for comfort.  May shower today. No baths for two days.      Resume Aspirin, Plavix, or Coumadin the day after the procedure unless otherwise instructed.   If diabetic,monitor your glucose carefully as steroids can increase glucose level    Seek immediate medical help for:   Severe increase in your usual pain or appearance of new pain.  Prolonged or increasing weakness or numbness in the legs or arms.    - Numbing medicine was injected that affects nerves that carry information from       muscles to brain and vice versa.  This numbness can last 4-6 hrs so be very careful walking.    Fever above 101 ,Drainage,redness,active  bleeding, or increased swelling at the injection site.  Headache, shortness of breath, chest pain, or breathing problems.         English

## 2020-01-05 NOTE — BRIEF OPERATIVE NOTE - NSICDXBRIEFPROCEDURE_GEN_ALL_CORE_FT
PROCEDURES:  Cholecystectomy, laparoscopic, with intraoperative cholangiogram 05-Jan-2020 20:54:35  Emery Wiggins

## 2020-01-05 NOTE — H&P ADULT - NSHPPHYSICALEXAM_GEN_ALL_CORE
Vital Signs Last 24 Hrs  T(C): 36.7 (05 Jan 2020 08:53), Max: 36.8 (05 Jan 2020 05:33)  T(F): 98.1 (05 Jan 2020 08:53), Max: 98.2 (05 Jan 2020 05:33)  HR: 72 (05 Jan 2020 08:53) (66 - 87)  BP: 139/92 (05 Jan 2020 08:53) (114/76 - 151/91)  BP(mean): --  RR: 16 (05 Jan 2020 08:53) (16 - 18)  SpO2: 100% (05 Jan 2020 08:53) (97% - 100%)    General: A&Ox3, NAD.  Neuro: Motor and sensory grossly intact with no focal deficits.  HEENT: Anicteric sclerae.  Respiratory: Unlabored breathing.   CVS: Regular rate and rhythm.  Abdomen: Soft, RUQ tenderness. Open appendectomy RLQ scar.  Extremities: Warm bilaterally.  MSK: Intact ROM.

## 2020-01-05 NOTE — ED ADULT NURSE NOTE - CHPI ED NUR SYMPTOMS NEG
no fever/no abdominal distension/no vomiting/no dysuria/no diarrhea/no blood in stool/no burning urination/no chills

## 2020-01-05 NOTE — ED PROVIDER NOTE - OBJECTIVE STATEMENT
54 year-old male with history of nephrolithiasis status-post stent & cystoscopy, GERD, appendectomy presents to the Emergency Department for RUQ/epigastric abdominal pain.  Patient mentions was working overnight when at 4AM began to have this pain. 54 year-old male with history of nephrolithiasis status-post stent & cystoscopy, GERD, appendectomy presents to the Emergency Department for RUQ/epigastric abdominal pain with radiation to the back.  Patient mentions was working overnight when at 4AM began to have this pain.  + nausea.  No fevers, chills, vomiting, chest pain, shortness of breath, diarrhea.  Symptoms similar to a month ago when he was seen by the surgical staff for cholelithiasis; offered surgery but patient refused.  Did not take any medications PTA.

## 2020-01-05 NOTE — ED PROVIDER NOTE - PHYSICAL EXAMINATION
*Gen: NAD, AAO*3  *HEENT: NC/AT, MMM, airway patent, trachea midline  *CV: RRR, S1/S2 present, no murmurs/rubs  *Resp: no respiratory distress, LCTAB, no wheezing/rales  *Abd: non-distended, mild epigastric/RUQ discomfort, no guarding or rigidity, no CVA TTP  *Neuro: no focal neuro deficits, moving all limbs appropriately  *Extremities: no gross deformity  *Skin: no rashes, no wounds   ~ Jarad Nicolas M.D.

## 2020-01-05 NOTE — ED PROVIDER NOTE - ATTENDING CONTRIBUTION TO CARE
[employer]  54y male pmh renal colic, gall stones adimtted two m ago for same was planning surg and according to pt he declined same when symptoms improved. Now comes to ed with complains of "same" pain  ruq mod no fever chill,nvdc,cp,sob.palps. Now consenting to or. PE WDWN mild distress neck supple chest clear anterior & posterior abd mild ruq ttp +/-murphys.  Cv no rubs, gallops or murmurs, Neruo no focal defects.   Yeyo Young MD, Facep

## 2020-01-05 NOTE — ED PROVIDER NOTE - CLINICAL SUMMARY MEDICAL DECISION MAKING FREE TEXT BOX
54 year-old male with history of nephrolithiasis status-post stent & cystoscopy, GERD, appendectomy presents to the Emergency Department for RUQ/epigastric abdominal pain; likely cholecystitis vs. cholelithiasis - US to eval.  DDx includes pancreatitis, GERD, nephrolithaisis.  Labs, US, fluids, symptomatic medications and reassess.

## 2020-01-05 NOTE — H&P ADULT - HISTORY OF PRESENT ILLNESS
54-year-old man s/p open appendectomy over 10 years ago, previously admitted to [facility] in November for acute cholecystitis but deferred surgery and discharged home after symptomatic improvement with antibiotics now presents with one day of RUQ pain. Pain similar to his symptoms during the last admission. Reports having nausea/vomiting. No fevers/chills.   During his previous admission, a MRCP was obtained because a dilated structure on his US was read as the CBD. No choledocholithiasis was found and LFTs remained within normal.

## 2020-01-05 NOTE — H&P ADULT - ASSESSMENT
Assessment/Plan: 54y Male with acute cholecystitis with a nonmobile stone in the gallbladder neck.   CBD 5mm.   WBC 12. LFTs within normal.     - Added on for laparoscopic cholecystectomy  - Will consent patient  - NPO, IV hydration  - Cefotetan    Pager [phone]

## 2020-01-05 NOTE — H&P ADULT - NSICDXPASTMEDICALHX_GEN_ALL_CORE_FT
PAST MEDICAL HISTORY:  Cholelithiases     GERD (gastroesophageal reflux disease)     Nephrolithiasis     Seasonal Allergies

## 2020-01-05 NOTE — H&P ADULT - NSHPLABSRESULTS_GEN_ALL_CORE
CBC Full  -  ( 05 Jan 2020 09:13 )  WBC Count : 12.62 K/uL  RBC Count : 4.98 M/uL  Hemoglobin : 14.5 g/dL  Hematocrit : 44.4 %  Platelet Count - Automated : 245 K/uL  Mean Cell Volume : 89.2 fl  Mean Cell Hemoglobin : 29.1 pg  Mean Cell Hemoglobin Concentration : 32.7 gm/dL  Auto Neutrophil # : 11.04 K/uL  Auto Lymphocyte # : 1.00 K/uL  Auto Monocyte # : 0.45 K/uL  Auto Eosinophil # : 0.04 K/uL  Auto Basophil # : 0.04 K/uL  Auto Neutrophil % : 87.5 %  Auto Lymphocyte % : 7.9 %  Auto Monocyte % : 3.6 %  Auto Eosinophil % : 0.3 %  Auto Basophil % : 0.3 %    01-05    132<L>  |  98  |  19  ----------------------------<  167<H>  4.0   |  27  |  1.11    Ca    9.3      05 Jan 2020 09:13    TPro  8.1  /  Alb  4.5  /  TBili  0.2  /  DBili  x   /  AST  27  /  ALT  41  /  AlkPhos  82  01-05    LIVER FUNCTIONS - ( 05 Jan 2020 09:13 )  Alb: 4.5 g/dL / Pro: 8.1 g/dL / ALK PHOS: 82 U/L / ALT: 41 U/L / AST: 27 U/L / GGT: x               RADIOLOGY:  US Abdomen Upper Quadrant Right (01.05.20 @ 10:28)     FINDINGS:    Liver: Diffusely increased echogenicity which can be seen in the setting of hepatic steatosis. Limited evaluation of the left lobe.    Bile ducts: Normal caliber. Common bile duct measures 5 mm.     Gallbladder: Cholelithiasis. There is a nonmobile stone measuring up to 2.2 cm in the neck of the gallbladder        Pancreas: Nonvisualized.    Right kidney: 10.9 cm.  No hydronephrosis. A 1.1 cm cyst is noted.    Ascites: None.    IVC: Limited evaluation.    IMPRESSION:     Cholelithiasis with a nonmobile 2.2 cm calculus in the neck of the gallbladder. This was mobile on prior ultrasound dated 11/21/2019. HIDA scan is suggested to assess for acute cholecystitis.

## 2020-01-05 NOTE — ED ADULT NURSE REASSESSMENT NOTE - NS ED NURSE REASSESS COMMENT FT1
Patient sitting up bed states no change in pain in abdomen 7/10 and he vomited when using the bathroom. MD made aware.

## 2020-01-05 NOTE — BRIEF OPERATIVE NOTE - OPERATION/FINDINGS
Acute cholecystitis, intraop cholangiogram showing opacification of intrahepatic ducts and duodenum. Placement of 19Fr Jared drain

## 2020-01-05 NOTE — H&P ADULT - ATTENDING COMMENTS
seen and examined 01-05-20 @ 1510    11/21 - 11/23/2019 - admitted to [facility] for acute cholecystitis but he refused urgent cholecystectomy and never followed up for elective cholecystectomy    soft / moderate RUQ tenderness / ND  Reginaldo Mert scar  no jaundice    WBC = 12  LFTs - wnl    RUQ U/S - 2.2 cm stone impacted in gallbladder neck. CBD 5 mm.      cholelithiasis with acute cholecystitis  -The risks (bleeding, infection, bile duct injury), benefits and alternatives of laparoscopic (possible open) cholecystectomy were discussed with the patient and his wife at bedside in the ER. Written informed consent was obtained for urgent surgery.

## 2020-01-05 NOTE — ED ADULT NURSE NOTE - OBJECTIVE STATEMENT
53 y/o M, PMH cholelithiasis, nephrolithiasis, PSH michelle, presents ambulatory to ED for gradual onset of worsening band like upper abdominal pain that began at 0400 when he was getting home from working a night shift, denies eating recently prior to onset of pain, feels like gall stone pain from when he was admitted in November for gallstones, did not want elective surgery at that time because he felt better and was d/c after 3 days. Reports it does not feel like his kidney stones. Did not take anything for the pain at home. Pos nausea, no v/d. Pt denies headache, dizziness, chest pain, palpitations, cough, SOB, n/v/d, urinary symptoms, fevers, chills, weakness at this time. Safety maintained.

## 2020-01-05 NOTE — H&P ADULT - NSICDXPASTSURGICALHX_GEN_ALL_CORE_FT
PAST SURGICAL HISTORY:  Acute Appendicitis s/p open appendectomy    cystoscopy, Ureteral stent     History of appendectomy

## 2020-01-05 NOTE — PRE-ANESTHESIA EVALUATION ADULT - NSANTHOSAYNRD_GEN_A_CORE
No. ARMIN screening performed.  STOP BANG Legend: 0-2 = LOW Risk; 3-4 = INTERMEDIATE Risk; 5-8 = HIGH Risk

## 2020-01-06 ENCOUNTER — TRANSCRIPTION ENCOUNTER (OUTPATIENT)
Age: 55
End: 2020-01-06

## 2020-01-06 LAB
ALBUMIN SERPL ELPH-MCNC: 4 G/DL — SIGNIFICANT CHANGE UP (ref 3.3–5)
ALP SERPL-CCNC: 77 U/L — SIGNIFICANT CHANGE UP (ref 40–120)
ALT FLD-CCNC: 64 U/L — HIGH (ref 10–45)
ANION GAP SERPL CALC-SCNC: 9 MMOL/L — SIGNIFICANT CHANGE UP (ref 5–17)
AST SERPL-CCNC: 47 U/L — HIGH (ref 10–40)
BILIRUB DIRECT SERPL-MCNC: 0.1 MG/DL — SIGNIFICANT CHANGE UP (ref 0–0.2)
BILIRUB INDIRECT FLD-MCNC: 0.2 MG/DL — SIGNIFICANT CHANGE UP (ref 0.2–1)
BILIRUB SERPL-MCNC: 0.3 MG/DL — SIGNIFICANT CHANGE UP (ref 0.2–1.2)
BUN SERPL-MCNC: 14 MG/DL — SIGNIFICANT CHANGE UP (ref 7–23)
CALCIUM SERPL-MCNC: 8.5 MG/DL — SIGNIFICANT CHANGE UP (ref 8.4–10.5)
CHLORIDE SERPL-SCNC: 100 MMOL/L — SIGNIFICANT CHANGE UP (ref 96–108)
CO2 SERPL-SCNC: 24 MMOL/L — SIGNIFICANT CHANGE UP (ref 22–31)
CREAT SERPL-MCNC: 0.99 MG/DL — SIGNIFICANT CHANGE UP (ref 0.5–1.3)
GLUCOSE SERPL-MCNC: 177 MG/DL — HIGH (ref 70–99)
HCT VFR BLD CALC: 45.1 % — SIGNIFICANT CHANGE UP (ref 39–50)
HGB BLD-MCNC: 15 G/DL — SIGNIFICANT CHANGE UP (ref 13–17)
MAGNESIUM SERPL-MCNC: 2.2 MG/DL — SIGNIFICANT CHANGE UP (ref 1.6–2.6)
MCHC RBC-ENTMCNC: 29.4 PG — SIGNIFICANT CHANGE UP (ref 27–34)
MCHC RBC-ENTMCNC: 33.3 GM/DL — SIGNIFICANT CHANGE UP (ref 32–36)
MCV RBC AUTO: 88.4 FL — SIGNIFICANT CHANGE UP (ref 80–100)
NRBC # BLD: 0 /100 WBCS — SIGNIFICANT CHANGE UP (ref 0–0)
PHOSPHATE SERPL-MCNC: 2 MG/DL — LOW (ref 2.5–4.5)
PLATELET # BLD AUTO: 224 K/UL — SIGNIFICANT CHANGE UP (ref 150–400)
POTASSIUM SERPL-MCNC: 4.2 MMOL/L — SIGNIFICANT CHANGE UP (ref 3.5–5.3)
POTASSIUM SERPL-SCNC: 4.2 MMOL/L — SIGNIFICANT CHANGE UP (ref 3.5–5.3)
PROT SERPL-MCNC: 7.3 G/DL — SIGNIFICANT CHANGE UP (ref 6–8.3)
RBC # BLD: 5.1 M/UL — SIGNIFICANT CHANGE UP (ref 4.2–5.8)
RBC # FLD: 13.7 % — SIGNIFICANT CHANGE UP (ref 10.3–14.5)
SODIUM SERPL-SCNC: 133 MMOL/L — LOW (ref 135–145)
WBC # BLD: 11.17 K/UL — HIGH (ref 3.8–10.5)
WBC # FLD AUTO: 11.17 K/UL — HIGH (ref 3.8–10.5)

## 2020-01-06 PROCEDURE — 99222 1ST HOSP IP/OBS MODERATE 55: CPT | Mod: GC

## 2020-01-06 RX ORDER — ACETAMINOPHEN 500 MG
650 TABLET ORAL EVERY 6 HOURS
Refills: 0 | Status: DISCONTINUED | OUTPATIENT
Start: 2020-01-06 | End: 2020-01-09

## 2020-01-06 RX ADMIN — Medication 1000 MILLIGRAM(S): at 08:10

## 2020-01-06 RX ADMIN — Medication 400 MILLIGRAM(S): at 15:23

## 2020-01-06 RX ADMIN — Medication 85 MILLIMOLE(S): at 12:05

## 2020-01-06 RX ADMIN — SODIUM CHLORIDE 50 MILLILITER(S): 9 INJECTION, SOLUTION INTRAVENOUS at 07:35

## 2020-01-06 RX ADMIN — Medication 400 MILLIGRAM(S): at 08:00

## 2020-01-06 RX ADMIN — ENOXAPARIN SODIUM 40 MILLIGRAM(S): 100 INJECTION SUBCUTANEOUS at 11:31

## 2020-01-06 RX ADMIN — Medication 1000 MILLIGRAM(S): at 15:53

## 2020-01-06 RX ADMIN — SODIUM CHLORIDE 125 MILLILITER(S): 9 INJECTION, SOLUTION INTRAVENOUS at 13:30

## 2020-01-06 NOTE — CONSULT NOTE ADULT - SUBJECTIVE AND OBJECTIVE BOX
Chief Complaint:  Patient is a 54y old  Male who presents with a chief complaint of abdominal pain (2020 06:41)      HPI:    54-year-old man s/p open appendectomy over 10 years ago, previously admitted to [facility] in November for acute cholecystitis but deferred surgery and discharged home after symptomatic improvement with antibiotics now presents with one day of RUQ pain s/p cholecystectomy with intraop cholangiogram showing opacification in the intrahepatic biliary tree.    During his previous admission, a MRCP was obtained because a dilated structure on his US was read as the CBD. No choledocholithiasis was found and LFTs remained within normal.    During admission, LFTs remained WNL with only slight rise today with AST and ALT in the 40s. WBC 12, patient afebrile.    Allergies:  No Known Allergies      Home Medications:    Hospital Medications:  acetaminophen  IVPB .. 1000 milliGRAM(s) IV Intermittent once  acetaminophen  IVPB .. 1000 milliGRAM(s) IV Intermittent once  enoxaparin Injectable 40 milliGRAM(s) SubCutaneous daily  lactated ringers. 1000 milliLiter(s) IV Continuous <Continuous>  oxyCODONE    IR 5 milliGRAM(s) Oral every 4 hours PRN  oxyCODONE    IR 10 milliGRAM(s) Oral every 4 hours PRN      PMHX/PSHX:  Cholelithiases  GERD (gastroesophageal reflux disease)  Seasonal Allergies  Nephrolithiasis  History of appendectomy  cystoscopy, Ureteral stent  Acute Appendicitis  Nephrolithiasis      Family history:  No pertinent family history in first degree relatives      Social History:     ROS:     General:  No wt loss, fevers, chills, night sweats, fatigue,   Eyes:  Good vision, no reported pain  ENT:  No sore throat, pain, runny nose, dysphagia  CV:  No pain, palpitations, hypo/hypertension  Resp:  No dyspnea, cough, tachypnea, wheezing  GI:  See HPI  :  No pain, bleeding, incontinence, nocturia  Muscle:  No pain, weakness  Neuro:  No weakness, tingling, memory problems  Psych:  No fatigue, insomnia, mood problems, depression  Endocrine:  No polyuria, polydipsia, cold/heat intolerance  Heme:  No petechiae, ecchymosis, easy bruisability  Skin:  No rash, edema      PHYSICAL EXAM:     GENERAL:  Appears stated age, well-groomed, well-nourished, no distress  HEENT:  NC/AT,  conjunctivae clear and pink,  no JVD  CHEST:  Full & symmetric excursion, no increased effort, breath sounds clear  HEART:  Regular rhythm, S1, S2, no murmur/rub/S3/S4, no abdominal bruit, no edema  ABDOMEN:  Soft, non-tender, non-distended, normoactive bowel sounds,  no masses ,  EXTREMITIES:  no cyanosis,clubbing or edema  SKIN:  No rash/erythema/ecchymoses/petechiae/wounds/abscess/warm/dry  NEURO:  Alert, oriented    Vital Signs:  Vital Signs Last 24 Hrs  T(C): 37.3 (2020 16:37), Max: 37.3 (2020 16:37)  T(F): 99.1 (2020 16:37), Max: 99.1 (2020 16:37)  HR: 83 (2020 16:37) (75 - 105)  BP: 121/72 (2020 16:37) (111/57 - 151/95)  BP(mean): 97 (2020 12:00) (97 - 117)  RR: 18 (2020 16:37) (14 - 18)  SpO2: 96% (2020 16:37) (92% - 100%)  Daily Height in cm: 177.8 (2020 17:46)    Daily     LABS:                        15.0   11.17 )-----------( 224      ( 2020 03:36 )             45.1     01-06    133<L>  |  100  |  14  ----------------------------<  177<H>  4.2   |  24  |  0.99    Ca    8.5      2020 03:36  Phos  2.0     01-06  Mg     2.2     01-06    TPro  7.3  /  Alb  4.0  /  TBili  0.3  /  DBili  0.1  /  AST  47<H>  /  ALT  64<H>  /  AlkPhos  77  01-06    LIVER FUNCTIONS - ( 2020 03:36 )  Alb: 4.0 g/dL / Pro: 7.3 g/dL / ALK PHOS: 77 U/L / ALT: 64 U/L / AST: 47 U/L / GGT: x             Urinalysis Basic - ( 2020 12:14 )    Color: Light Yellow / Appearance: Clear / S.025 / pH: x  Gluc: x / Ketone: Negative  / Bili: Negative / Urobili: Negative   Blood: x / Protein: Negative / Nitrite: Negative   Leuk Esterase: Negative / RBC: 0 /hpf / WBC 1 /HPF   Sq Epi: x / Non Sq Epi: 0 /hpf / Bacteria: Negative          Imaging: Chief Complaint:  Patient is a 54y old  Male who presents with a chief complaint of abdominal pain (2020 06:41)      HPI:    54-year-old man s/p open appendectomy over 10 years ago, previously admitted to [facility] in November for acute cholecystitis but deferred surgery and discharged home after symptomatic improvement with antibiotics now presents with one day of RUQ pain s/p cholecystectomy with intraop cholangiogram showing opacification in the intrahepatic biliary tree.    During his previous admission, a MRCP was obtained because a dilated structure on his US was read as the CBD. No choledocholithiasis was found and LFTs remained within normal.    During admission, LFTs remained WNL with only slight rise today with AST and ALT in the 40s. WBC 12, patient afebrile.    Allergies:  No Known Allergies      Home Medications:    Hospital Medications:  acetaminophen  IVPB .. 1000 milliGRAM(s) IV Intermittent once  acetaminophen  IVPB .. 1000 milliGRAM(s) IV Intermittent once  enoxaparin Injectable 40 milliGRAM(s) SubCutaneous daily  lactated ringers. 1000 milliLiter(s) IV Continuous <Continuous>  oxyCODONE    IR 5 milliGRAM(s) Oral every 4 hours PRN  oxyCODONE    IR 10 milliGRAM(s) Oral every 4 hours PRN      PMHX/PSHX:  Cholelithiases  GERD (gastroesophageal reflux disease)  Seasonal Allergies  Nephrolithiasis  History of appendectomy  cystoscopy, Ureteral stent  Acute Appendicitis  Nephrolithiasis      Family history:  No pertinent family history in first degree relatives      Social History:     ROS:     General:  No wt loss, fevers, chills, night sweats, fatigue,   Eyes:  Good vision, no reported pain  ENT:  No sore throat, pain, runny nose, dysphagia  CV:  No pain, palpitations, hypo/hypertension  Resp:  No dyspnea, cough, tachypnea, wheezing  GI:  See HPI  :  No pain, bleeding, incontinence, nocturia  Muscle:  No pain, weakness  Neuro:  No weakness, tingling, memory problems  Psych:  No fatigue, insomnia, mood problems, depression  Endocrine:  No polyuria, polydipsia, cold/heat intolerance  Heme:  No petechiae, ecchymosis, easy bruisability  Skin:  No rash, edema      PHYSICAL EXAM:     GENERAL:  NAD  HEENT:  sclera anicteric  ABDOMEN:  soft, nt, +surgical scars with dressings, nd, +surgical drain with bloody serosangionous fluid  EXTREMITIES:  no cyanosis,clubbing or edema  NEURO:  Alert, oriented    Vital Signs:  Vital Signs Last 24 Hrs  T(C): 37.3 (2020 16:37), Max: 37.3 (2020 16:37)  T(F): 99.1 (2020 16:37), Max: 99.1 (2020 16:37)  HR: 83 (2020 16:37) (75 - 105)  BP: 121/72 (2020 16:37) (111/57 - 151/95)  BP(mean): 97 (2020 12:00) (97 - 117)  RR: 18 (2020 16:37) (14 - 18)  SpO2: 96% (2020 16:37) (92% - 100%)  Daily Height in cm: 177.8 (2020 17:46)    Daily     LABS:                        15.0   11.17 )-----------( 224      ( 2020 03:36 )             45.1     01-06    133<L>  |  100  |  14  ----------------------------<  177<H>  4.2   |  24  |  0.99    Ca    8.5      2020 03:36  Phos  2.0     01-06  Mg     2.2     01-06    TPro  7.3  /  Alb  4.0  /  TBili  0.3  /  DBili  0.1  /  AST  47<H>  /  ALT  64<H>  /  AlkPhos  77  01-06    LIVER FUNCTIONS - ( 2020 03:36 )  Alb: 4.0 g/dL / Pro: 7.3 g/dL / ALK PHOS: 77 U/L / ALT: 64 U/L / AST: 47 U/L / GGT: x             Urinalysis Basic - ( 2020 12:14 )    Color: Light Yellow / Appearance: Clear / S.025 / pH: x  Gluc: x / Ketone: Negative  / Bili: Negative / Urobili: Negative   Blood: x / Protein: Negative / Nitrite: Negative   Leuk Esterase: Negative / RBC: 0 /hpf / WBC 1 /HPF   Sq Epi: x / Non Sq Epi: 0 /hpf / Bacteria: Negative          Imaging:

## 2020-01-06 NOTE — CONSULT NOTE ADULT - ATTENDING COMMENTS
As above.    Patient feels well, without significant abdominal pain.    Plan EUS/ERCP later this week (not 1/7/19, and unlikely 1/8/19)

## 2020-01-06 NOTE — PROGRESS NOTE ADULT - SUBJECTIVE AND OBJECTIVE BOX
ACS Post-op Note  STATUS POST:  lap cholecystectomy   POST OPERATIVE DAY #: 0    pt seen and examined at bedside. c/o mild RUQ pain. voiding freely. denies f/c/n/v. dizziness, SOB, CP or palpitations.     Vital Signs Last 24 Hrs  T(C): 36.5 (2020 00:00), Max: 37.1 (2020 15:57)  T(F): 97.7 (2020 00:00), Max: 98.7 (2020 15:57)  HR: 87 (2020 01:00) (66 - 105)  BP: 137/85 (2020 01:00) (114/76 - 151/95)  BP(mean): 105 (2020 01:00) (100 - 117)  RR: 16 (2020 00:00) (15 - 18)  SpO2: 93% (2020 01:00) (92% - 100%)  I&O's Summary    2020 07:01  -  2020 01:47  --------------------------------------------------------  IN: 250 mL / OUT: 640 mL / NET: -390 mL      I&O's Detail    2020 07:01  -  2020 01:47  --------------------------------------------------------  IN:    lactated ringers.: 250 mL  Total IN: 250 mL    OUT:    Bulb: 40 mL    Voided: 600 mL  Total OUT: 640 mL    Total NET: -390 mL    MEDICATIONS  (STANDING):  acetaminophen  IVPB .. 1000 milliGRAM(s) IV Intermittent once  acetaminophen  IVPB .. 1000 milliGRAM(s) IV Intermittent once  acetaminophen  IVPB .. 1000 milliGRAM(s) IV Intermittent once  acetaminophen  IVPB .. 1000 milliGRAM(s) IV Intermittent once  acetaminophen  IVPB .. 1000 milliGRAM(s) IV Intermittent once  enoxaparin Injectable 40 milliGRAM(s) SubCutaneous daily  lactated ringers. 1000 milliLiter(s) (50 mL/Hr) IV Continuous <Continuous>    MEDICATIONS  (PRN):  HYDROmorphone  Injectable 0.5 milliGRAM(s) IV Push every 10 minutes PRN Moderate Pain (4 - 6)  ondansetron Injectable 4 milliGRAM(s) IV Push once PRN Nausea and/or Vomiting  oxyCODONE    IR 5 milliGRAM(s) Oral every 4 hours PRN Moderate Pain (4 - 6)  oxyCODONE    IR 10 milliGRAM(s) Oral every 4 hours PRN Severe Pain (7 - 10)      LABS:                        14.5   12.62 )-----------( 245      ( 2020 09:13 )             44.4     01-05    132<L>  |  98  |  19  ----------------------------<  167<H>  4.0   |  27  |  1.11    Ca    9.3      2020 09:13    TPro  8.1  /  Alb  4.5  /  TBili  0.2  /  DBili  x   /  AST  27  /  ALT  41  /  AlkPhos  82  01-05      Urinalysis Basic - ( 2020 12:14 )    Color: Light Yellow / Appearance: Clear / S.025 / pH: x  Gluc: x / Ketone: Negative  / Bili: Negative / Urobili: Negative   Blood: x / Protein: Negative / Nitrite: Negative   Leuk Esterase: Negative / RBC: 0 /hpf / WBC 1 /HPF   Sq Epi: x / Non Sq Epi: 0 /hpf / Bacteria: Negative        RADIOLOGY & ADDITIONAL STUDIES:    PHYSICAL EXAM:  Constitutional: NAD, A&O x 4  Respiratory: non-labor breathing   Cardiovascular: RRR  Gastrointestinal: soft, slightly distended, tympanic, NT. port sites c/d/i. drain with 20 cc bilious fluid.       A/P: 54y Male s/p open appendectomy over 10 years ago, previously admitted to [facility] in November for acute cholecystitis but deferred surgery and discharged home after symptomatic improvement with antibiotics now presents with one day of RUQ pain, abd US showed a nonmobile stone measuring up to 2.2 cm in the neck of the gallbladder  s/p lap cholecystectomy POD #0     - Diet: regular diet  - Activity: OOB as tolerated.   - Labs:  f/u AM labs   - Pain medication: tylenol, oxycodone prn   - DVT ppx: Lovenox     ACS  [id number]

## 2020-01-06 NOTE — CONSULT NOTE ADULT - ASSESSMENT
54-year-old man s/p open appendectomy over 10 years ago, previously admitted to [facility] in November for acute cholecystitis but deferred surgery and discharged home after symptomatic improvement with antibiotics now presents with one day of RUQ pain s/p cholecystectomy with intraop cholangiogram showing opacification in the intrahepatic biliary tree.      1)Choledocholithiasis  Seen on intraop cholangiogram. MRCP negative. T bili and ALK phos WNL. AST and ALT only mildly elevated in 40s. No evidence of cholangitis.    - trend LFTs and CBC daily  - plan as per surgery team  - can tentatively plan for EUS/ERCP on Wednesday and Thursday 54-year-old man s/p open appendectomy over 10 years ago, previously admitted to [facility] in November for acute cholecystitis but deferred surgery and discharged home after symptomatic improvement with antibiotics now presents with one day of RUQ pain s/p cholecystectomy with intraop cholangiogram showing suspected common bile duct stone.      1)Choledocholithiasis  MRCP did not show choledocholithiasis, but stone suspected on intraoperative cholangiogram. T bili and ALK phos WNL. AST and ALT only mildly elevated in 40s. No evidence of cholangitis.    - trend LFTs and CBC daily  - plan as per surgery team  - can tentatively plan for EUS/ERCP on Wednesday or Thursday

## 2020-01-06 NOTE — DISCHARGE NOTE PROVIDER - NSDCCPCAREPLAN_GEN_ALL_CORE_FT
PRINCIPAL DISCHARGE DIAGNOSIS  Diagnosis: Cholelithiases  Assessment and Plan of Treatment: S/p laparoscopic cholecystectomy, IOC showing filling defect, ERCP with sphincterotomy and pancreatic duct stent placement.

## 2020-01-06 NOTE — DISCHARGE NOTE PROVIDER - NSDCCPTREATMENT_GEN_ALL_CORE_FT
PRINCIPAL PROCEDURE  Procedure: Cholecystectomy, laparoscopic, with intraoperative cholangiogram  Findings and Treatment: Acute cholecystitis, intraop cholangiogram showing opacification of intrahepatic ducts and duodenum. Placement of 19Fr Jared drain      SECONDARY PROCEDURE  Procedure: ERCP  Findings and Treatment: - Biliary sludge in the common bile duct, removed by biliary sphincterotomy,                        balloon sweep                       - Placement ofone 5 Fr by 3 cm plastic pancreatic duct stent to reduce risk                        of pancreatitis.

## 2020-01-06 NOTE — DISCHARGE NOTE PROVIDER - NSDCMRMEDTOKEN_GEN_ALL_CORE_FT
acetaminophen 325 mg oral tablet: 2 tab(s) orally every 6 hours  oxyCODONE 5 mg oral tablet: 1 tab(s) orally every 4 hours, As needed, Moderate Pain (4 - 6) MDD:6 tabs

## 2020-01-06 NOTE — DISCHARGE NOTE PROVIDER - HOSPITAL COURSE
54 y.o. male presented with RUQ pain on 1/5/20 recently admitted to [facility] for acute cholecystitis but he refused urgent cholecystectomy and never followed up for elective cholecystectomy    He had a WBC of 12 and LFTs in normal limits.  RUQ U/S - 2.2 cm stone impacted in gallbladder neck. CBD 5 mm. He was admitted to [facility] with cholelithiasis with acute cholecystitis. He was NPO on IVF and given cefotetan.     Surgery risks, benefits and alternatives of laparoscopic (possible open) cholecystectomy were discussed with the patient and his wife at bedside in the ER. Written informed consent was obtained for urgent surgery. He underwent a laparoscopic cholecystectomy with IOC and placement of a 19F drain. IOC revealed opacification of the intrahepatic ducts and duodenum. 54 y.o. male presented with RUQ pain on 1/5/20 recently admitted to [facility] for acute cholecystitis but he refused urgent cholecystectomy and never followed up for elective cholecystectomy    He had a WBC of 12 and LFTs in normal limits.  RUQ U/S - 2.2 cm stone impacted in gallbladder neck. CBD 5 mm. He was admitted to [facility] with cholelithiasis with acute cholecystitis. He was NPO on IVF and given cefotetan.     Surgery risks, benefits and alternatives of laparoscopic (possible open) cholecystectomy were discussed with the patient and his wife at bedside in the ER. Written informed consent was obtained for urgent surgery. He underwent a laparoscopic cholecystectomy with IOC and placement of a 19F drain. IOC revealed opacification of the intrahepatic ducts and duodenum.  Patient underwent ERCP on 1/8/19 which chowed   biliary sludge in the CBD, removed by  biliary sphincterotomy, balloon sweep; placement of one 5Fr by 3cm plastic pancreatic duct stent to reduce risk of pancreatitis. Patient tolerated procedure well. Post-procedure diet was advanced as tolerated. At the time of discharge, the patient was hemodynamically stable, was tolerating PO diet, was voiding urine and passing stool, was ambulating, and was comfortable with adequate pain control.

## 2020-01-06 NOTE — DISCHARGE NOTE PROVIDER - NSDCFUADDINST_GEN_ALL_CORE_FT
You will be discharged with YASEMIN drains. You will need to empty them and record outputs accurately. This will be taught to you by the nursing staff. Please do not remove the YAESMIN drains. They will be removed in the office. Please bring to the office accurate records of output.  WOUND CARE: Keep incision clean and dry.  Cover with dry, sterile dressing.  BATHING: Please do not submerge wound underwater. You may shower and/or sponge bathe.  ACTIVITY: No heavy lifting or straining. Otherwise, you may return to your usual level of physical activity. If you are taking narcotic pain medication (such as Percocet), do NOT drive a car, operate machinery or make important decisions.  DIET: Regular diet.  NOTIFY YOUR SURGEON IF: You have any bleeding that does not stop, any pus draining from your wound, any fever (over 100.4 F) or chills, persistent nausea/vomiting, persistent diarrhea, or if your pain is not controlled on your discharge pain medications.  FOLLOW-UP:  1. Follow-up with Dr. Lawrence within 1-2 weeks of discharge.  Please call office for appointment  2. Please follow up with your primary care physician in one week regarding your hospitalization. You will be discharged with YASEMIN drains. You will need to empty them and record outputs accurately. This will be taught to you by the nursing staff. Please do not remove the YASEMIN drains. They will be removed in the office. Please bring to the office accurate records of output.  WOUND CARE: Keep incision clean and dry.  Cover with dry, sterile dressing.  BATHING: Please do not submerge wound underwater. You may shower and/or sponge bathe.  ACTIVITY: No heavy lifting or straining. Otherwise, you may return to your usual level of physical activity. If you are taking narcotic pain medication (such as Percocet), do NOT drive a car, operate machinery or make important decisions.  DIET: Regular diet.  NOTIFY YOUR SURGEON IF: You have any bleeding that does not stop, any pus draining from your wound, any fever (over 100.4 F) or chills, persistent nausea/vomiting, persistent diarrhea, or if your pain is not controlled on your discharge pain medications.  FOLLOW-UP:  1. Follow-up with Dr. Lawrence within 1-2 weeks of discharge.  Please call office for appointment  2. Please follow up with your primary care physician in one week regarding your hospitalization.      Please call the GI office follow up in 2 weeks. Call [phone] to schedule.  Please obtain abdominal radiograph in 10 days - if pancreatic stent persists you will need an endoscopy to remove the stent within 4 weeks.

## 2020-01-06 NOTE — DISCHARGE NOTE PROVIDER - CARE PROVIDERS DIRECT ADDRESSES
[email or web address]

## 2020-01-06 NOTE — DISCHARGE NOTE PROVIDER - PROVIDER TOKENS
PROVIDER:[TOKEN:[[id number]],FOLLOWUP:[1 week]] PROVIDER:[TOKEN:[[id number]],FOLLOWUP:[1 week]],PROVIDER:[TOKEN:[[id number]],FOLLOWUP:[2 weeks]]

## 2020-01-07 LAB
ALBUMIN SERPL ELPH-MCNC: 3.6 G/DL — SIGNIFICANT CHANGE UP (ref 3.3–5)
ALP SERPL-CCNC: 67 U/L — SIGNIFICANT CHANGE UP (ref 40–120)
ALT FLD-CCNC: 46 U/L — HIGH (ref 10–45)
ANION GAP SERPL CALC-SCNC: 8 MMOL/L — SIGNIFICANT CHANGE UP (ref 5–17)
AST SERPL-CCNC: 36 U/L — SIGNIFICANT CHANGE UP (ref 10–40)
BILIRUB SERPL-MCNC: 0.3 MG/DL — SIGNIFICANT CHANGE UP (ref 0.2–1.2)
BUN SERPL-MCNC: 16 MG/DL — SIGNIFICANT CHANGE UP (ref 7–23)
CALCIUM SERPL-MCNC: 8.5 MG/DL — SIGNIFICANT CHANGE UP (ref 8.4–10.5)
CHLORIDE SERPL-SCNC: 101 MMOL/L — SIGNIFICANT CHANGE UP (ref 96–108)
CO2 SERPL-SCNC: 27 MMOL/L — SIGNIFICANT CHANGE UP (ref 22–31)
CREAT SERPL-MCNC: 1 MG/DL — SIGNIFICANT CHANGE UP (ref 0.5–1.3)
GLUCOSE SERPL-MCNC: 118 MG/DL — HIGH (ref 70–99)
HCT VFR BLD CALC: 40.4 % — SIGNIFICANT CHANGE UP (ref 39–50)
HGB BLD-MCNC: 13.5 G/DL — SIGNIFICANT CHANGE UP (ref 13–17)
MAGNESIUM SERPL-MCNC: 2.3 MG/DL — SIGNIFICANT CHANGE UP (ref 1.6–2.6)
MCHC RBC-ENTMCNC: 29.7 PG — SIGNIFICANT CHANGE UP (ref 27–34)
MCHC RBC-ENTMCNC: 33.4 GM/DL — SIGNIFICANT CHANGE UP (ref 32–36)
MCV RBC AUTO: 89 FL — SIGNIFICANT CHANGE UP (ref 80–100)
PHOSPHATE SERPL-MCNC: 2.4 MG/DL — LOW (ref 2.5–4.5)
PLATELET # BLD AUTO: 206 K/UL — SIGNIFICANT CHANGE UP (ref 150–400)
POTASSIUM SERPL-MCNC: 3.8 MMOL/L — SIGNIFICANT CHANGE UP (ref 3.5–5.3)
POTASSIUM SERPL-SCNC: 3.8 MMOL/L — SIGNIFICANT CHANGE UP (ref 3.5–5.3)
PROT SERPL-MCNC: 7 G/DL — SIGNIFICANT CHANGE UP (ref 6–8.3)
RBC # BLD: 4.54 M/UL — SIGNIFICANT CHANGE UP (ref 4.2–5.8)
RBC # FLD: 13.9 % — SIGNIFICANT CHANGE UP (ref 10.3–14.5)
SODIUM SERPL-SCNC: 136 MMOL/L — SIGNIFICANT CHANGE UP (ref 135–145)
WBC # BLD: 11.9 K/UL — HIGH (ref 3.8–10.5)
WBC # FLD AUTO: 11.9 K/UL — HIGH (ref 3.8–10.5)

## 2020-01-07 RX ORDER — SODIUM,POTASSIUM PHOSPHATES 278-250MG
1 POWDER IN PACKET (EA) ORAL ONCE
Refills: 0 | Status: COMPLETED | OUTPATIENT
Start: 2020-01-07 | End: 2020-01-07

## 2020-01-07 RX ORDER — SENNA PLUS 8.6 MG/1
2 TABLET ORAL AT BEDTIME
Refills: 0 | Status: DISCONTINUED | OUTPATIENT
Start: 2020-01-07 | End: 2020-01-09

## 2020-01-07 RX ORDER — SODIUM CHLORIDE 9 MG/ML
1000 INJECTION, SOLUTION INTRAVENOUS
Refills: 0 | Status: DISCONTINUED | OUTPATIENT
Start: 2020-01-07 | End: 2020-01-09

## 2020-01-07 RX ADMIN — SENNA PLUS 2 TABLET(S): 8.6 TABLET ORAL at 21:18

## 2020-01-07 RX ADMIN — Medication 1 PACKET(S): at 13:26

## 2020-01-07 RX ADMIN — Medication 650 MILLIGRAM(S): at 14:48

## 2020-01-07 RX ADMIN — Medication 650 MILLIGRAM(S): at 14:18

## 2020-01-07 RX ADMIN — Medication 650 MILLIGRAM(S): at 21:49

## 2020-01-07 RX ADMIN — Medication 650 MILLIGRAM(S): at 21:18

## 2020-01-07 RX ADMIN — ENOXAPARIN SODIUM 40 MILLIGRAM(S): 100 INJECTION SUBCUTANEOUS at 13:27

## 2020-01-07 NOTE — PROGRESS NOTE ADULT - SUBJECTIVE AND OBJECTIVE BOX
Surgery Daily Progress Note     53yo Male    --------------------------------------------------------------------------------------------------------------------  SUBJECTIVE / 24H EVENTS  Patient seen and examined on morning rounds. No acute interval events.      OBJECTIVE:    VITAL SIGNS:  T(C): 37 (20 @ 09:16), Max: 37.3 (20 @ 16:37)  HR: 77 (20 @ 09:16) (74 - 88)  BP: 122/80 (20 09:16) (107/67 - 143/67)  RR: 18 (20 09:16) (14 - 18)  SpO2: 97% (20 @ 09:16) (93% - 98%)  Daily         PHYSICAL EXAM:  Gen: NAD  Resp: Respirations unlabored.  Card: RRR.  GI: Soft. Nontender. Nondistended. Port sites c/d/i. Drain SS  Ext: Warm, well perfused      20 @ 07:01  -  20 @ 07:00  --------------------------------------------------------  IN:    IV PiggyBack: 100 mL    lactated ringers.: 750 mL    Oral Fluid: 467 mL    Solution: 425 mL    Solution: 100 mL  Total IN: 1842 mL    OUT:    Bulb: 95 mL    Voided: 1775 mL  Total OUT: 1870 mL    Total NET: -28 mL      20 @ 07:01  -  20 @ 10:09  --------------------------------------------------------  IN:  Total IN: 0 mL    OUT:    Voided: 400 mL  Total OUT: 400 mL    Total NET: -400 mL          LAB VALUES:      136  |  101  |  16  ----------------------------<  118<H>  3.8   |  27  |  1.00    Ca    8.5      2020 06:28  Phos  2.4       Mg     2.3         TPro  7.0  /  Alb  3.6  /  TBili  0.3  /  DBili  x   /  AST  36  /  ALT  46<H>  /  AlkPhos  67                                 13.5   11.90 )-----------( 206      ( 2020 09:27 )             40.4     LIVER FUNCTIONS - ( 2020 06:28 )  Alb: 3.6 g/dL / Pro: 7.0 g/dL / ALK PHOS: 67 U/L / ALT: 46 U/L / AST: 36 U/L / GGT: x                   Urinalysis Basic - ( 2020 12:14 )    Color: Light Yellow / Appearance: Clear / S.025 / pH: x  Gluc: x / Ketone: Negative  / Bili: Negative / Urobili: Negative   Blood: x / Protein: Negative / Nitrite: Negative   Leuk Esterase: Negative / RBC: 0 /hpf / WBC 1 /HPF   Sq Epi: x / Non Sq Epi: 0 /hpf / Bacteria: Negative        MICROBIOLOGY:      RADIOLOGY:        MEDICATIONS  (STANDING):  acetaminophen   Tablet .. 650 milliGRAM(s) Oral every 6 hours  enoxaparin Injectable 40 milliGRAM(s) SubCutaneous daily  potassium phosphate / sodium phosphate powder 1 Packet(s) Oral once    MEDICATIONS  (PRN):  oxyCODONE    IR 5 milliGRAM(s) Oral every 4 hours PRN Moderate Pain (4 - 6)  oxyCODONE    IR 10 milliGRAM(s) Oral every 4 hours PRN Severe Pain (7 - 10)

## 2020-01-07 NOTE — CHART NOTE - NSCHARTNOTEFT_GEN_A_CORE
LFTs downtrending  Vitals stable overnight. Afebrile    - please keep NPO after midnight tonight for tentative procedure tomorrow if scheduling permits  - IV abx  - Rest of plan as per surgical team

## 2020-01-07 NOTE — PROGRESS NOTE ADULT - ATTENDING COMMENTS
Pt seen and examined with resident and PA, agree with above. Pt noted that he feels well, with mild soreness in RUQ. Tolerating diet. Pending ERCP tomorrow. NPO after midnight. Will likely pull drain after ERCP.

## 2020-01-07 NOTE — PROGRESS NOTE ADULT - ASSESSMENT
54y Male POD #2 s/p lap teetee for acute cholecystitis with a nonmobile stone in the gallbladder neck. GI to evaluate/treat retained stone with EUS and ERCP Wednesday/Thursday.    PLAN:  - Regular diet  - NPO @ MN  - hold DVT ppx @MN  - RTC tylenol, PRN oxy 5/10      ACS Trauma  [id number] 54y Male POD #2 s/p lap teetee for acute cholecystitis with a nonmobile stone in the gallbladder neck. GI to evaluate/treat retained stone with EUS and ERCP Wednesday/Thursday. LFTs down from yesterday, ALT at upper limit of normal.    PLAN:  - Regular diet  - NPO @ MN  - hold DVT ppx @MN  - RTC tylenol, PRN oxy 5/10      ACS Trauma  [id number]

## 2020-01-08 LAB
ALBUMIN SERPL ELPH-MCNC: 3.5 G/DL — SIGNIFICANT CHANGE UP (ref 3.3–5)
ALP SERPL-CCNC: 65 U/L — SIGNIFICANT CHANGE UP (ref 40–120)
ALT FLD-CCNC: 46 U/L — HIGH (ref 10–45)
ANION GAP SERPL CALC-SCNC: 14 MMOL/L — SIGNIFICANT CHANGE UP (ref 5–17)
AST SERPL-CCNC: 28 U/L — SIGNIFICANT CHANGE UP (ref 10–40)
BILIRUB DIRECT SERPL-MCNC: <0.1 MG/DL — SIGNIFICANT CHANGE UP (ref 0–0.2)
BILIRUB INDIRECT FLD-MCNC: >0.2 MG/DL — SIGNIFICANT CHANGE UP (ref 0.2–1)
BILIRUB SERPL-MCNC: 0.3 MG/DL — SIGNIFICANT CHANGE UP (ref 0.2–1.2)
BUN SERPL-MCNC: 17 MG/DL — SIGNIFICANT CHANGE UP (ref 7–23)
CALCIUM SERPL-MCNC: 8.4 MG/DL — SIGNIFICANT CHANGE UP (ref 8.4–10.5)
CHLORIDE SERPL-SCNC: 100 MMOL/L — SIGNIFICANT CHANGE UP (ref 96–108)
CO2 SERPL-SCNC: 24 MMOL/L — SIGNIFICANT CHANGE UP (ref 22–31)
CREAT SERPL-MCNC: 1.02 MG/DL — SIGNIFICANT CHANGE UP (ref 0.5–1.3)
GLUCOSE SERPL-MCNC: 132 MG/DL — HIGH (ref 70–99)
HCT VFR BLD CALC: 42.1 % — SIGNIFICANT CHANGE UP (ref 39–50)
HGB BLD-MCNC: 14.2 G/DL — SIGNIFICANT CHANGE UP (ref 13–17)
MAGNESIUM SERPL-MCNC: 2.2 MG/DL — SIGNIFICANT CHANGE UP (ref 1.6–2.6)
MCHC RBC-ENTMCNC: 29.7 PG — SIGNIFICANT CHANGE UP (ref 27–34)
MCHC RBC-ENTMCNC: 33.7 GM/DL — SIGNIFICANT CHANGE UP (ref 32–36)
MCV RBC AUTO: 88.1 FL — SIGNIFICANT CHANGE UP (ref 80–100)
PHOSPHATE SERPL-MCNC: 3.6 MG/DL — SIGNIFICANT CHANGE UP (ref 2.5–4.5)
PLATELET # BLD AUTO: 210 K/UL — SIGNIFICANT CHANGE UP (ref 150–400)
POTASSIUM SERPL-MCNC: 4 MMOL/L — SIGNIFICANT CHANGE UP (ref 3.5–5.3)
POTASSIUM SERPL-SCNC: 4 MMOL/L — SIGNIFICANT CHANGE UP (ref 3.5–5.3)
PROT SERPL-MCNC: 6.7 G/DL — SIGNIFICANT CHANGE UP (ref 6–8.3)
RBC # BLD: 4.78 M/UL — SIGNIFICANT CHANGE UP (ref 4.2–5.8)
RBC # FLD: 13.8 % — SIGNIFICANT CHANGE UP (ref 10.3–14.5)
SODIUM SERPL-SCNC: 138 MMOL/L — SIGNIFICANT CHANGE UP (ref 135–145)
SURGICAL PATHOLOGY STUDY: SIGNIFICANT CHANGE UP
WBC # BLD: 7.5 K/UL — SIGNIFICANT CHANGE UP (ref 3.8–10.5)
WBC # FLD AUTO: 7.5 K/UL — SIGNIFICANT CHANGE UP (ref 3.8–10.5)

## 2020-01-08 PROCEDURE — 43264 ERCP REMOVE DUCT CALCULI: CPT | Mod: GC,59

## 2020-01-08 PROCEDURE — 43274 ERCP DUCT STENT PLACEMENT: CPT | Mod: GC

## 2020-01-08 RX ORDER — ENOXAPARIN SODIUM 100 MG/ML
40 INJECTION SUBCUTANEOUS EVERY 24 HOURS
Refills: 0 | Status: DISCONTINUED | OUTPATIENT
Start: 2020-01-08 | End: 2020-01-09

## 2020-01-08 RX ADMIN — SENNA PLUS 2 TABLET(S): 8.6 TABLET ORAL at 21:19

## 2020-01-08 NOTE — PROGRESS NOTE ADULT - SUBJECTIVE AND OBJECTIVE BOX
TRAUMA / ACS SURGERY PROGRESS NOTE    54yMale    SUBJECTIVE:  Patient seen and examined at bedside. No acute events overnight. NPO with tentative/possible procedure with GI. No abdomina pain. Denies fevers, chills, nausea, vomiting, chest pain, and shortness of breath.     --------------------------------------------------------------------------------------------------  OBJECTIVE:     Physical Exam:  General: AAOx3, NAD, resting comfortably   HEENT: NC/AT  Respiratory: no increased work of breathing   Abdomen: soft, nontender, nondistended. port sites c/d/i, drain with SS output   Extremities: warm and well perfused  --------------------------------------------------------------------------------------------------  Vital Signs:  Vital Signs Last 24 Hrs  T(C): 36.9 (08 Jan 2020 06:10), Max: 37.3 (07 Jan 2020 17:29)  T(F): 98.4 (08 Jan 2020 06:10), Max: 99.2 (07 Jan 2020 17:29)  HR: 70 (08 Jan 2020 06:10) (70 - 80)  BP: 121/84 (08 Jan 2020 06:10) (113/72 - 127/84)  BP(mean): --  RR: 18 (08 Jan 2020 06:10) (18 - 18)  SpO2: 97% (08 Jan 2020 06:10) (95% - 98%)    --------------------------------------------------------------------------------------------------  Inputs/Outputs:    07 Jan 2020 07:01  -  08 Jan 2020 07:00  --------------------------------------------------------  IN:    dextrose 5% + sodium chloride 0.45%.: 700 mL    Oral Fluid: 820 mL  Total IN: 1520 mL    OUT:    Bulb: 40 mL    Voided: 1350 mL  Total OUT: 1390 mL    Total NET: 130 mL    --------------------------------------------------------------------------------------------------  Laboratories:                        13.5   11.90 )-----------( 206      ( 07 Jan 2020 09:27 )             40.4     LIVER FUNCTIONS - ( 08 Jan 2020 06:53 )  Alb: 3.5 g/dL / Pro: 6.7 g/dL / ALK PHOS: 65 U/L / ALT: 46 U/L / AST: 28 U/L / GGT: x             08 Jan 2020 06:53    138    |  100    |  17     ----------------------------<  132    4.0     |  24     |  1.02     Ca    8.4        08 Jan 2020 06:53  Phos  3.6       08 Jan 2020 06:53  Mg     2.2       08 Jan 2020 06:53    TPro  6.7    /  Alb  3.5    /  TBili  0.3    /  DBili  <0.1   /  AST  28     /  ALT  46     /  AlkPhos  65     08 Jan 2020 06:53    --------------------------------------------------------------------------------------------------  Medications:  MEDICATIONS  (STANDING):  acetaminophen   Tablet .. 650 milliGRAM(s) Oral every 6 hours  dextrose 5% + sodium chloride 0.45%. 1000 milliLiter(s) (100 mL/Hr) IV Continuous <Continuous>  senna 2 Tablet(s) Oral at bedtime    MEDICATIONS  (PRN):  oxyCODONE    IR 5 milliGRAM(s) Oral every 4 hours PRN Moderate Pain (4 - 6)  oxyCODONE    IR 10 milliGRAM(s) Oral every 4 hours PRN Severe Pain (7 - 10)

## 2020-01-08 NOTE — PROGRESS NOTE ADULT - SUBJECTIVE AND OBJECTIVE BOX
Pre-Endoscopy Evaluation      Referring Physician: dr. apurva thorne                                  Procedure: eus+/-ercp    Indication for Procedure: suspected CBD stone    Pertinent History: 54y old male s/p cholecystectomy POD# 3 with intra-op cholangiogram showing suspected common bile duct stone      PAST MEDICAL & SURGICAL HISTORY:  Cholelithiases  GERD (gastroesophageal reflux disease)  Seasonal Allergies  Nephrolithiasis  History of appendectomy  cystoscopy, Ureteral stent  Acute Appendicitis: s/p open appendectomy      PMH of Gastroparesis [ ]  Gastric Surgery [ ]  Gastric Outlet Obstruction [ ]    Allergies    No Known Allergies    Intolerances    Latex allergy: [ ] yes [x] no    Medications:MEDICATIONS  (STANDING):  acetaminophen   Tablet .. 650 milliGRAM(s) Oral every 6 hours  dextrose 5% + sodium chloride 0.45%. 1000 milliLiter(s) (100 mL/Hr) IV Continuous <Continuous>  senna 2 Tablet(s) Oral at bedtime    MEDICATIONS  (PRN):  oxyCODONE    IR 5 milliGRAM(s) Oral every 4 hours PRN Moderate Pain (4 - 6)  oxyCODONE    IR 10 milliGRAM(s) Oral every 4 hours PRN Severe Pain (7 - 10)      Smoking: [ ] yes  [X] no    AICD/PPM: [ ] yes   [X] no    Pertinent lab data:                        14.2   7.50  )-----------( 210      ( 08 Jan 2020 08:50 )             42.1     01-08    138  |  100  |  17  ----------------------------<  132<H>  4.0   |  24  |  1.02    Ca    8.4      08 Jan 2020 06:53  Phos  3.6     01-08  Mg     2.2     01-08    TPro  6.7  /  Alb  3.5  /  TBili  0.3  /  DBili  <0.1  /  AST  28  /  ALT  46<H>  /  AlkPhos  65  01-08          Physical Examination:  Daily   Vital Signs Last 24 Hrs  T(C): 36.9 (08 Jan 2020 09:40), Max: 37.3 (07 Jan 2020 17:29)  T(F): 98.4 (08 Jan 2020 09:40), Max: 99.2 (07 Jan 2020 17:29)  HR: 58 (08 Jan 2020 09:40) (58 - 80)  BP: 132/83 (08 Jan 2020 09:40) (113/72 - 132/83)  BP(mean): --  RR: 18 (08 Jan 2020 09:40) (18 - 18)  SpO2: 97% (08 Jan 2020 09:40) (95% - 98%)      Constitutional: NAD    Neck:  No JVD    Respiratory: CTAB/L    Cardiovascular: S1 and S2    Gastrointestinal: BS+, soft, NT/ND    Extremities: No peripheral edema    Neurological: A/O x 3    : No [place]    Skin: No rashes    Comments:      The patient is a suitable candidate for the planned procedure unless box checked [ ]  No, explain:

## 2020-01-08 NOTE — PROGRESS NOTE ADULT - ASSESSMENT
ASSESSMENT:   54y Male POD #3 s/p lap teetee for acute cholecystitis with a nonmobile stone in the gallbladder neck. GI to evaluate/treat retained stone with EUS and ERCP today/tomorrow.     PLAN:  - F/U AM labs/ LFTs   - NPO pending GI schedule/availability for EUS/ERCP  - F/U GI plan   - holding DVT ppx, will resume post GI-procedure  - RTC tylenol, PRN oxy 5/10 for pain       ACS Trauma  [id number] ASSESSMENT:   54y Male POD #3 s/p lap teetee for acute cholecystitis with a nonmobile stone in the gallbladder neck. GI to evaluate/treat retained stone with EUS and ERCP today/tomorrow.     PLAN:  - F/U AM labs/ LFTs   - NPO pending GI schedule/availability for EUS/ERCP  - F/U GI plan   - Likely will discontinue RUQ drain post ERCP  - holding DVT ppx, will resume post GI-procedure  - RTC tylenol, PRN oxy 5/10 for pain       ACS Trauma  [id number]

## 2020-01-09 ENCOUNTER — TRANSCRIPTION ENCOUNTER (OUTPATIENT)
Age: 55
End: 2020-01-09

## 2020-01-09 VITALS
TEMPERATURE: 98 F | SYSTOLIC BLOOD PRESSURE: 113 MMHG | DIASTOLIC BLOOD PRESSURE: 71 MMHG | HEART RATE: 65 BPM | RESPIRATION RATE: 18 BRPM | OXYGEN SATURATION: 99 %

## 2020-01-09 LAB
ALBUMIN SERPL ELPH-MCNC: 3.6 G/DL — SIGNIFICANT CHANGE UP (ref 3.3–5)
ALP SERPL-CCNC: 73 U/L — SIGNIFICANT CHANGE UP (ref 40–120)
ALT FLD-CCNC: 82 U/L — HIGH (ref 10–45)
AMYLASE P1 CFR SERPL: 79 U/L — SIGNIFICANT CHANGE UP (ref 25–125)
ANION GAP SERPL CALC-SCNC: 15 MMOL/L — SIGNIFICANT CHANGE UP (ref 5–17)
AST SERPL-CCNC: 52 U/L — HIGH (ref 10–40)
BILIRUB DIRECT SERPL-MCNC: <0.1 MG/DL — SIGNIFICANT CHANGE UP (ref 0–0.2)
BILIRUB INDIRECT FLD-MCNC: >0.3 MG/DL — SIGNIFICANT CHANGE UP (ref 0.2–1)
BILIRUB SERPL-MCNC: 0.4 MG/DL — SIGNIFICANT CHANGE UP (ref 0.2–1.2)
BUN SERPL-MCNC: 15 MG/DL — SIGNIFICANT CHANGE UP (ref 7–23)
CALCIUM SERPL-MCNC: 8.5 MG/DL — SIGNIFICANT CHANGE UP (ref 8.4–10.5)
CHLORIDE SERPL-SCNC: 101 MMOL/L — SIGNIFICANT CHANGE UP (ref 96–108)
CO2 SERPL-SCNC: 21 MMOL/L — LOW (ref 22–31)
CREAT SERPL-MCNC: 0.86 MG/DL — SIGNIFICANT CHANGE UP (ref 0.5–1.3)
GLUCOSE SERPL-MCNC: 159 MG/DL — HIGH (ref 70–99)
HCT VFR BLD CALC: 42.5 % — SIGNIFICANT CHANGE UP (ref 39–50)
HGB BLD-MCNC: 14 G/DL — SIGNIFICANT CHANGE UP (ref 13–17)
LIDOCAIN IGE QN: 70 U/L — HIGH (ref 7–60)
MAGNESIUM SERPL-MCNC: 2.2 MG/DL — SIGNIFICANT CHANGE UP (ref 1.6–2.6)
MCHC RBC-ENTMCNC: 29.2 PG — SIGNIFICANT CHANGE UP (ref 27–34)
MCHC RBC-ENTMCNC: 32.9 GM/DL — SIGNIFICANT CHANGE UP (ref 32–36)
MCV RBC AUTO: 88.7 FL — SIGNIFICANT CHANGE UP (ref 80–100)
PHOSPHATE SERPL-MCNC: 3.2 MG/DL — SIGNIFICANT CHANGE UP (ref 2.5–4.5)
PLATELET # BLD AUTO: 234 K/UL — SIGNIFICANT CHANGE UP (ref 150–400)
POTASSIUM SERPL-MCNC: 4.4 MMOL/L — SIGNIFICANT CHANGE UP (ref 3.5–5.3)
POTASSIUM SERPL-SCNC: 4.4 MMOL/L — SIGNIFICANT CHANGE UP (ref 3.5–5.3)
PROT SERPL-MCNC: 6.9 G/DL — SIGNIFICANT CHANGE UP (ref 6–8.3)
RBC # BLD: 4.79 M/UL — SIGNIFICANT CHANGE UP (ref 4.2–5.8)
RBC # FLD: 13.1 % — SIGNIFICANT CHANGE UP (ref 10.3–14.5)
SODIUM SERPL-SCNC: 137 MMOL/L — SIGNIFICANT CHANGE UP (ref 135–145)
WBC # BLD: 9.01 K/UL — SIGNIFICANT CHANGE UP (ref 3.8–10.5)
WBC # FLD AUTO: 9.01 K/UL — SIGNIFICANT CHANGE UP (ref 3.8–10.5)

## 2020-01-09 PROCEDURE — 74300 X-RAY BILE DUCTS/PANCREAS: CPT

## 2020-01-09 PROCEDURE — 84100 ASSAY OF PHOSPHORUS: CPT

## 2020-01-09 PROCEDURE — 86900 BLOOD TYPING SEROLOGIC ABO: CPT

## 2020-01-09 PROCEDURE — 81001 URINALYSIS AUTO W/SCOPE: CPT

## 2020-01-09 PROCEDURE — C2617: CPT

## 2020-01-09 PROCEDURE — 85027 COMPLETE CBC AUTOMATED: CPT

## 2020-01-09 PROCEDURE — 99232 SBSQ HOSP IP/OBS MODERATE 35: CPT | Mod: GC

## 2020-01-09 PROCEDURE — 96374 THER/PROPH/DIAG INJ IV PUSH: CPT

## 2020-01-09 PROCEDURE — 74330 X-RAY BILE/PANC ENDOSCOPY: CPT

## 2020-01-09 PROCEDURE — 83735 ASSAY OF MAGNESIUM: CPT

## 2020-01-09 PROCEDURE — C1889: CPT

## 2020-01-09 PROCEDURE — 80053 COMPREHEN METABOLIC PANEL: CPT

## 2020-01-09 PROCEDURE — 80048 BASIC METABOLIC PNL TOTAL CA: CPT

## 2020-01-09 PROCEDURE — 86901 BLOOD TYPING SEROLOGIC RH(D): CPT

## 2020-01-09 PROCEDURE — 83605 ASSAY OF LACTIC ACID: CPT

## 2020-01-09 PROCEDURE — 88304 TISSUE EXAM BY PATHOLOGIST: CPT

## 2020-01-09 PROCEDURE — C1769: CPT

## 2020-01-09 PROCEDURE — 96375 TX/PRO/DX INJ NEW DRUG ADDON: CPT

## 2020-01-09 PROCEDURE — 80076 HEPATIC FUNCTION PANEL: CPT

## 2020-01-09 PROCEDURE — 86850 RBC ANTIBODY SCREEN: CPT

## 2020-01-09 PROCEDURE — 99285 EMERGENCY DEPT VISIT HI MDM: CPT | Mod: 25

## 2020-01-09 PROCEDURE — 76705 ECHO EXAM OF ABDOMEN: CPT

## 2020-01-09 PROCEDURE — 83690 ASSAY OF LIPASE: CPT

## 2020-01-09 PROCEDURE — 82150 ASSAY OF AMYLASE: CPT

## 2020-01-09 RX ORDER — ACETAMINOPHEN 500 MG
2 TABLET ORAL
Qty: 0 | Refills: 0 | DISCHARGE
Start: 2020-01-09

## 2020-01-09 RX ORDER — OXYCODONE HYDROCHLORIDE 5 MG/1
1 TABLET ORAL
Qty: 12 | Refills: 0
Start: 2020-01-09 | End: 2020-01-10

## 2020-01-09 RX ADMIN — ENOXAPARIN SODIUM 40 MILLIGRAM(S): 100 INJECTION SUBCUTANEOUS at 05:47

## 2020-01-09 RX ADMIN — Medication 650 MILLIGRAM(S): at 10:14

## 2020-01-09 NOTE — DISCHARGE NOTE NURSING/CASE MANAGEMENT/SOCIAL WORK - PATIENT PORTAL LINK FT
You can access the FollowMyHealth Patient Portal offered by [facility] by registering at the following website: [email or web address]. By joining [facility]’s FollowMyHealth portal, you will also be able to view your health information using other applications (apps) compatible with our system.

## 2020-01-09 NOTE — PROGRESS NOTE ADULT - SUBJECTIVE AND OBJECTIVE BOX
Interval events: Advanced to regular diet, tolerated.    S: Patient doing well, denies fevers, chills, nausea, emesis, chest pain, SOB. Pain well controlled. Tolerating regular diet.     O: Vital Signs  T(C): 36.2 (01-09 @ 08:55), Max: 37 (01-08 @ 13:43)  HR: 67 (01-09 @ 08:55) (67 - 78)  BP: 121/84 (01-09 @ 08:55) (102/67 - 127/86)  RR: 18 (01-09 @ 08:55) (18 - 18)  SpO2: 100% (01-09 @ 08:55) (95% - 100%)  01-08-20 @ 07:01  -  01-09-20 @ 07:00  --------------------------------------------------------  IN: 3200 mL / OUT: 1600 mL / NET: 1600 mL    01-09-20 @ 07:01  -  01-09-20 @ 11:58  --------------------------------------------------------  IN: 350 mL / OUT: 1 mL / NET: 349 mL      General: alert and oriented, NAD  Resp: airway patent, respirations unlabored  CVS: regular rate and rhythm  Abdomen: soft, nontender, nondistended, drain with SS output in bulb, incisions c/d/i  Extremities: no edema  Skin: warm, dry, appropriate color                          14.0   9.01  )-----------( 234      ( 09 Jan 2020 09:33 )             42.5   01-09    137  |  101  |  15  ----------------------------<  159<H>  4.4   |  21<L>  |  0.86    Ca    8.5      09 Jan 2020 06:01  Phos  3.2     01-09  Mg     2.2     01-09    TPro  6.9  /  Alb  3.6  /  TBili  0.4  /  DBili  <0.1  /  AST  52<H>  /  ALT  82<H>  /  AlkPhos  73  01-09

## 2020-01-09 NOTE — PROGRESS NOTE ADULT - ASSESSMENT
54y Male POD #4 s/p lap teetee for acute cholecystitis with a nonmobile stone in the gallbladder neck. s/p EUS/ERCP 1/8/2020; Biliary sludge in common bile duct removed by biliary sphincterotomy balloon sweep; Placement of plastic pancreatic duct stent. Recovering well.     PLAN:  - F/U AM labs/ LFTs   - F/U GI plan   - Likely will discontinue RUQ drain post ERCP  - DVT ppx: Lovenox  - RTC tylenol, PRN oxy 5/10 for pain   - Diet: CLD       ACS Trauma  [id number]

## 2020-01-09 NOTE — PROGRESS NOTE ADULT - SUBJECTIVE AND OBJECTIVE BOX
Chief Complaint:  Patient is a 54y old  Male who presents with a chief complaint of abdominal pain (09 Jan 2020 03:54)      Interval Events:   ERCP done yesterday s/p sphincterotomy and balloon sweep with removal of sludge and a pancreatic duct stent put in place    Allergies:  No Known Allergies      Home Medications:    Hospital Medications:  acetaminophen   Tablet .. 650 milliGRAM(s) Oral every 6 hours  dextrose 5% + sodium chloride 0.45%. 1000 milliLiter(s) IV Continuous <Continuous>  enoxaparin Injectable 40 milliGRAM(s) SubCutaneous every 24 hours  oxyCODONE    IR 5 milliGRAM(s) Oral every 4 hours PRN  senna 2 Tablet(s) Oral at bedtime      PMHX/PSHX:  Cholelithiases  GERD (gastroesophageal reflux disease)  Seasonal Allergies  Nephrolithiasis  History of appendectomy  cystoscopy, Ureteral stent  Acute Appendicitis  Nephrolithiasis      Family history:  No pertinent family history in first degree relatives      ROS:     General:  No wt loss, fevers, chills, night sweats, fatigue,   Eyes:  Good vision, no reported pain  ENT:  No sore throat, pain, runny nose, dysphagia  CV:  No pain, palpitations, hypo/hypertension  Resp:  No dyspnea, cough, tachypnea, wheezing  GI:  No pain, No nausea, No vomiting, No diarrhea, No constipation, No weight loss, No fever, No pruritis, No rectal bleeding, No tarry stools, No dysphagia,  :  No pain, bleeding, incontinence, nocturia  Muscle:  No pain, weakness  Neuro:  No weakness, tingling, memory problems  Psych:  No fatigue, insomnia, mood problems, depression  Endocrine:  No polyuria, polydipsia, cold/heat intolerance  Heme:  No petechiae, ecchymosis, easy bruisability  Skin:  No rash, tattoos, scars, edema      PHYSICAL EXAM:   Vital Signs:  Vital Signs Last 24 Hrs  T(C): 36.9 (09 Jan 2020 04:19), Max: 37 (08 Jan 2020 13:43)  T(F): 98.4 (09 Jan 2020 04:19), Max: 98.6 (08 Jan 2020 13:43)  HR: 78 (09 Jan 2020 04:19) (58 - 78)  BP: 102/67 (09 Jan 2020 04:19) (102/67 - 132/83)  BP(mean): --  RR: 18 (09 Jan 2020 04:19) (18 - 18)  SpO2: 95% (09 Jan 2020 04:19) (95% - 98%)  Daily     Daily     GENERAL:  NAD  HEENT:  sclera anicteric  ABDOMEN:  soft, nt, +surgical scars with dressings, nd, +surgical drain with bloody serosangionous fluid  EXTREMITIES:  no cyanosis,clubbing or edema  NEURO:  Alert, oriented  LABS:                        14.2   7.50  )-----------( 210      ( 08 Jan 2020 08:50 )             42.1     01-09    137  |  101  |  15  ----------------------------<  159<H>  4.4   |  21<L>  |  0.86    Ca    8.5      09 Jan 2020 06:01  Phos  3.2     01-09  Mg     2.2     01-09    TPro  6.9  /  Alb  3.6  /  TBili  0.4  /  DBili  <0.1  /  AST  52<H>  /  ALT  82<H>  /  AlkPhos  73  01-09    LIVER FUNCTIONS - ( 09 Jan 2020 06:01 )  Alb: 3.6 g/dL / Pro: 6.9 g/dL / ALK PHOS: 73 U/L / ALT: 82 U/L / AST: 52 U/L / GGT: x               Amylase Serum79      Lipase serum70       Ammonia--      Imaging:

## 2020-01-09 NOTE — PROGRESS NOTE ADULT - SUBJECTIVE AND OBJECTIVE BOX
Vascular Surgery Progress Note     Subjective/24hour Events: No acute events overnight. He underwent an ERCP last evening. He notes minimal abdominal pain. He has tolerated fluids and desires clears for the morning. He denies any other concerns at this time.     Vital Signs:  Vital Signs Last 24 Hrs  T(C): 36.8 (09 Jan 2020 00:04), Max: 37 (08 Jan 2020 13:43)  T(F): 98.3 (09 Jan 2020 00:04), Max: 98.6 (08 Jan 2020 13:43)  HR: 69 (09 Jan 2020 00:04) (58 - 78)  BP: 112/72 (09 Jan 2020 00:04) (112/72 - 132/83)  BP(mean): --  RR: 18 (09 Jan 2020 00:04) (18 - 18)  SpO2: 97% (09 Jan 2020 00:04) (97% - 98%)        I&O's Detail    07 Jan 2020 07:01  -  08 Jan 2020 07:00  --------------------------------------------------------  IN:    dextrose 5% + sodium chloride 0.45%.: 700 mL    Oral Fluid: 820 mL  Total IN: 1520 mL    OUT:    Bulb: 40 mL    Voided: 1350 mL  Total OUT: 1390 mL    Total NET: 130 mL      08 Jan 2020 07:01  -  09 Jan 2020 03:54  --------------------------------------------------------  IN:    dextrose 5% + sodium chloride 0.45%.: 1200 mL    Oral Fluid: 800 mL  Total IN: 2000 mL    OUT:    Bulb: 50 mL    Voided: 1550 mL  Total OUT: 1600 mL    Total NET: 400 mL            MEDICATIONS  (STANDING):  acetaminophen   Tablet .. 650 milliGRAM(s) Oral every 6 hours  dextrose 5% + sodium chloride 0.45%. 1000 milliLiter(s) (100 mL/Hr) IV Continuous <Continuous>  enoxaparin Injectable 40 milliGRAM(s) SubCutaneous every 24 hours  senna 2 Tablet(s) Oral at bedtime    MEDICATIONS  (PRN):  oxyCODONE    IR 5 milliGRAM(s) Oral every 4 hours PRN Moderate Pain (4 - 6)        Physical Exam:  Gen: male of stated age, in NAD  Resp: no increased work of breathing, no SOB, no wheezing, rales, or crackles  Card: RRR, no Murmurs, Rubs, or Gallops  Abdomen: soft, mild epigastric tenderness, nondistended. port sites c/d/i, drain with SS output      Labs:    01-08    138  |  100  |  17  ----------------------------<  132<H>  4.0   |  24  |  1.02    Ca    8.4      08 Jan 2020 06:53  Phos  3.6     01-08  Mg     2.2     01-08    TPro  6.7  /  Alb  3.5  /  TBili  0.3  /  DBili  <0.1  /  AST  28  /  ALT  46<H>  /  AlkPhos  65  01-08    LIVER FUNCTIONS - ( 08 Jan 2020 06:53 )  Alb: 3.5 g/dL / Pro: 6.7 g/dL / ALK PHOS: 65 U/L / ALT: 46 U/L / AST: 28 U/L / GGT: x                                 14.2   7.50  )-----------( 210      ( 08 Jan 2020 08:50 )             42.1         PROCEDURES:      [facility]  ____________________________________________________________________________________________________  Patient Name: Adriano Shelton                       MRN: [id number]  Account Number: [id number]                     YOB: 1965  Room: Endoscopy Room 2                           Gender: Male  Attending MD: Fransico Reina MD                    Procedure Date No Time: 1/8/2020  ____________________________________________________________________________________________________     Procedure:           ERCP  Indications:         54M with recent cholecystectomy and suspected choledocholithiasis on                        intraoperative cholangiogram here for ERCP  Providers:           Fransico Reina MD, Rajinder Becerra (Fellow)  Referring MD:        Martín Lawrence MD  Medicines:           General Anesthesia, Indomethacin 100 mg NV, Cipro 400 mg IV  Complications:       No immediate complications.  ____________________________________________________________________________________________________  Procedure:           Pre-Anesthesia Assessment:                       - Prior to the procedure, a History and Physical was performed, and patient                        medications and allergies were reviewed. The risks and benefits of the                        procedure and the sedation options and risks were discussed with the patient.                        All questions were answered and informed consent was obtained. Patient                        identification and proposed procedure were verified. After reviewing the                        risks and benefits, the patient was deemed in satisfactory condition to                        undergo the procedure. The anesthesia plan was to use general anesthesia.                        Immediately prior to administration of medications, the patient was                        re-assessed for adequacy to receive sedatives. The heart rate, respiratory                        rate, oxygen saturations, blood pressure, adequacy of pulmonary ventilation,                        and response to care were monitored throughout the procedure. The physical                        status of the patient was re-assessed after the procedure.                       After obtaining informed consent, the scope was passed under direct vision.                        Throughout the procedure, the patient's blood pressure, pulse, and oxygen                        saturations were monitored continuously. The duodenoscope was introduced                        through the mouth, and advanced to the duodenum and used to inject contrast                        into the bile duct. The ERCP was accomplished without difficulty. The patient                        tolerated the procedure well.                                                                                                        Findings:       - A drainage catheter in the RUQ of the abdomen was seen on the  film.       - The esophagus was successfully intubated under direct vision.       - The scope was advanced to the major papilla in the descending duodenum.       - Using the Hydratome RX44, a preloaded 0.035 in by 260 cm guidewire was passed into the        pancreatic duct.       - Using double wire technique, a wire was passed into the bile duct. The bile duct was then        deeply cannulated over the guidewire.       - Contrast was injected. The bile duct was non-dilated and there was no obvious filling        defect.       - Biliary sphincterotomy was made with the sphincterotome using ERBE electrocautery. There        was no post-sphincterotomy bleeding.       - The bile duct was swept with an 8.5 mm balloon starting at the bifurcation. Bile and a        small amount of sludge was swept from the duct.       - Final occlusion cholangiogram revealed no filling defects.       - One 5 Fr by 3 cm temporary plastic pancreatic stent was placed into the pancreatic duct.        The stent was in good position.                                                                                                        Impression:          - Biliary sludge in the common bile duct, removed by biliary sphincterotomy,                        balloon sweep                       - Placement of one 5 Fr by 3 cm plastic pancreatic duct stent to reduce risk                        of pancreatitis.  Recommendation:      - [facility].                       - May have liquid diet tonight if feeling well, advance diet as tolerated                        tomorrow.                       - Monitor CBC, LFTs.                       - GI office followup in 2 weeks. Call [phone] to schedule.                       - Obtain abdominal radiograph in 10 days - if pancreatic stent persists                        patient will need EGD with PD stent removal within 4 weeks.                       - If patient develops fever, significant abdominal pain, recurrent vomiting,                        or melena tonight, obtain STAT CBC/CMP/amylase/lipase/upright CXR/abdominal X                        ray and page GI on call team.                                                                                                        Attending Participation:       I was present and participated during the entire procedure, including non-key portions.                                                                                                          ______________  Fransico Reina MD  1/8/2020 10:03:21 PM  Number of Addenda: 0    Note Initiated On: 1/8/2020 5:00 PM

## 2020-01-09 NOTE — PROGRESS NOTE ADULT - ASSESSMENT
54y Male POD #4 s/p lap teetee for acute cholecystitis with a nonmobile stone in the gallbladder neck. s/p EUS/ERCP 1/8/2020; Biliary sludge in common bile duct removed by biliary sphincterotomy balloon sweep; Placement of plastic pancreatic duct stent. Recovering well.     PLAN:  - F/U AM labs/ LFTs   - F/U GI plan   - DVT ppx: Lovenox  - RTC tylenol, PRN oxy 5/10 for pain   - Diet: Reg  - Discharge to home with YASEMIN in place. Patient to receive drain teaching prior to discharge today.      ACS Trauma  [id number]

## 2020-01-09 NOTE — PROGRESS NOTE ADULT - ASSESSMENT
54-year-old man s/p open appendectomy over 10 years ago, previously admitted to ACS in November for acute cholecystitis but deferred surgery and discharged home after symptomatic improvement with antibiotics now presents with one day of RUQ pain s/p cholecystectomy with intraop cholangiogram showing suspected common bile duct stone.      1)Choledocholithiasis  MRCP did not show choledocholithiasis, but stone suspected on intraoperative cholangiogram. T bili and ALK phos WNL. AST and ALT only mildly elevated in 40s. No evidence of cholangitis.  s/p ERCP on 1/8 with sphincterotomy and balloon sweep with removal of sludge and pancreatic duct stent put in place  2) Acute cholecystitis  s/p cholecytectomy    - trend LFTs and CBC daily  - will need abdominal xray in 10 days to see if PD stent still in place. If it is still present, patient will need EGD with stent removal. Patient can f/u with GI as outpatient with Dr. Reina (call [phone] to schedule)  - rest of plan as per surgical team 54-year-old man s/p open appendectomy over 10 years ago, previously admitted to ACS in November for acute cholecystitis but deferred surgery and discharged home after symptomatic improvement with antibiotics now presents with one day of RUQ pain s/p cholecystectomy with intraop cholangiogram showing suspected common bile duct stone.      1)Choledocholithiasis  MRCP did not show choledocholithiasis, but stone suspected on intraoperative cholangiogram. T bili and ALK phos WNL. AST and ALT only mildly elevated in 40s. No evidence of cholangitis.  s/p ERCP on 1/8 with sphincterotomy and balloon sweep with removal of sludge and pancreatic duct stent put in place  2) Acute cholecystitis  s/p cholecytectomy    - trend LFTs and CBC daily  - will need abdominal xray in 10 days to see if PD stent still in place. If it is still present, patient will need EGD with stent removal. Patient can f/u with GI as outpatient with Dr. Reina (call [phone] to schedule) in 7-10 days from discharge.  - rest of plan as per surgical team

## 2020-01-13 DIAGNOSIS — Z96.89 PRESENCE OF OTHER SPECIFIED FUNCTIONAL IMPLANTS: ICD-10-CM

## 2020-01-16 PROBLEM — K80.20 CALCULUS OF GALLBLADDER WITHOUT CHOLECYSTITIS WITHOUT OBSTRUCTION: Chronic | Status: ACTIVE | Noted: 2020-01-05

## 2020-01-16 PROBLEM — K21.9 GASTRO-ESOPHAGEAL REFLUX DISEASE WITHOUT ESOPHAGITIS: Chronic | Status: ACTIVE | Noted: 2020-01-05

## 2020-01-21 ENCOUNTER — TRANSCRIPTION ENCOUNTER (OUTPATIENT)
Age: 55
End: 2020-01-21

## 2020-01-24 ENCOUNTER — APPOINTMENT (OUTPATIENT)
Dept: TRAUMA SURGERY | Facility: CLINIC | Age: 55
End: 2020-01-24
Payer: MEDICAID

## 2020-01-24 ENCOUNTER — OUTPATIENT (OUTPATIENT)
Dept: OUTPATIENT SERVICES | Facility: HOSPITAL | Age: 55
LOS: 1 days | End: 2020-01-24
Payer: MEDICAID

## 2020-01-24 ENCOUNTER — APPOINTMENT (OUTPATIENT)
Dept: RADIOLOGY | Facility: CLINIC | Age: 55
End: 2020-01-24
Payer: MEDICAID

## 2020-01-24 VITALS
WEIGHT: 195 LBS | HEART RATE: 75 BPM | HEIGHT: 70 IN | DIASTOLIC BLOOD PRESSURE: 77 MMHG | TEMPERATURE: 98 F | BODY MASS INDEX: 27.92 KG/M2 | SYSTOLIC BLOOD PRESSURE: 126 MMHG

## 2020-01-24 DIAGNOSIS — Z90.49 ACQUIRED ABSENCE OF OTHER SPECIFIED PARTS OF DIGESTIVE TRACT: Chronic | ICD-10-CM

## 2020-01-24 DIAGNOSIS — Z00.8 ENCOUNTER FOR OTHER GENERAL EXAMINATION: ICD-10-CM

## 2020-01-24 DIAGNOSIS — K80.66 CALCULUS OF GALLBLADDER AND BILE DUCT WITH ACUTE AND CHRONIC CHOLECYSTITIS W/OUT OBSTRUCTION: ICD-10-CM

## 2020-01-24 PROCEDURE — 74019 RADEX ABDOMEN 2 VIEWS: CPT

## 2020-01-24 PROCEDURE — 99024 POSTOP FOLLOW-UP VISIT: CPT

## 2020-01-24 PROCEDURE — 74019 RADEX ABDOMEN 2 VIEWS: CPT | Mod: 26

## 2020-01-24 RX ORDER — ACETAMINOPHEN 325 MG/1
TABLET, FILM COATED ORAL
Refills: 0 | Status: ACTIVE | COMMUNITY

## 2020-01-24 NOTE — PHYSICAL EXAM
[de-identified] : appears well [de-identified] : no jaundice [de-identified] : soft / NT / ND. trocar incisions healed without infection.\par

## 2020-01-24 NOTE — HISTORY OF PRESENT ILLNESS
[de-identified] : A nonobstructing CBD stone ws seen on cholangiogram.\par On 1/8, he underwent ERCP / sphincterotomy / removal of sludge / pancreatic duct stent.\par Pathology demonstrated cholelithiasis with acute and chronic cholecystitis\par On 1/9, he was discharged home with the Jared drain in place.\par  [de-identified] : tolerating regular diet without nausea, vomiting or abdominal pain.\par He was seen in urgent care on Tues 1/21 for 1 day of cough and fever. He was Rx levofloxacin for pneumonia and his symptoms have since resolved.\par \par

## 2020-01-24 NOTE — PLAN
[FreeTextEntry1] : We made an appointment for his X-ray abdomen today to assure that the PD stent has passed.

## 2020-02-18 ENCOUNTER — TRANSCRIPTION ENCOUNTER (OUTPATIENT)
Age: 55
End: 2020-02-18

## 2020-02-25 ENCOUNTER — APPOINTMENT (OUTPATIENT)
Dept: GASTROENTEROLOGY | Facility: CLINIC | Age: 55
End: 2020-02-25

## 2020-05-12 ENCOUNTER — APPOINTMENT (OUTPATIENT)
Dept: GASTROENTEROLOGY | Facility: CLINIC | Age: 55
End: 2020-05-12
Payer: MEDICAID

## 2020-05-12 DIAGNOSIS — K76.0 FATTY (CHANGE OF) LIVER, NOT ELSEWHERE CLASSIFIED: ICD-10-CM

## 2020-05-12 DIAGNOSIS — K80.50 CALCULUS OF BILE DUCT W/OUT CHOLANGITIS OR CHOLECYSTITIS W/OUT OBSTRUCTION: ICD-10-CM

## 2020-05-12 DIAGNOSIS — R74.8 ABNORMAL LEVELS OF OTHER SERUM ENZYMES: ICD-10-CM

## 2020-05-12 PROCEDURE — 99213 OFFICE O/P EST LOW 20 MIN: CPT | Mod: 95

## 2020-05-16 RX ORDER — LEVOFLOXACIN 750 MG/1
TABLET, FILM COATED ORAL
Refills: 0 | Status: DISCONTINUED | COMMUNITY
End: 2020-05-16

## 2020-05-16 NOTE — HISTORY OF PRESENT ILLNESS
[Verbal consent obtained from patient] : the patient, [unfilled] [Time Spent: ___ minutes] : I have spent [unfilled] minutes with the patient on the telephone [FreeTextEntry1] : Patient follows up from hospitalization for cholangitis requiring ERCP/removal of choledocholithiasis, and placement of pancreatic stent.\par \par Doing well.\par Asymptomatic, without fevers, chills, abdominal pain, heartburn, dysphagia, nausea, vomiting, constipation, diarrhea, melena, hematochezia, jaundice or weight loss.\par \par Pt states last colonoscopy 5 years ago.\par \par 1/9/20 LFTs AST 52, ALT 82\par \par MRI/MRCP 11/21/19\par \par Distended gallbladder with stones and nonspecific wall edema. No \par significant pericholecystic inflammation.\par \par No biliary ductal dilatation. No choledocholithiasis or abnormal biliary \par enhancement. \par \par Mild hepatic steatosis.\par \par ERCP 1/8/20\par \par Impression:          - Biliary sludge in the common bile duct, removed by biliary sphincterotomy, \par                      balloon sweep\par                      - Placement of one 5 Fr by 3 cm plastic pancreatic duct stent to reduce risk \par                      of pancreatitis.\par \par Followup abdominal X ray in late Jan 2020 demonstrated spontaneous passage of the pancreatic stent.

## 2020-05-16 NOTE — PLAN
[FreeTextEntry1] : Impression:\par \par #1.  Hospitalization in Jan 2020 for cholangitis requiring ERCP/removal of choledocholithiasis, and placement of pancreatic stent.  Abdominal X ray in late Jan 2020 demonstrated spontaneous passage of the pancreatic stent.\par \par #2.  Abnormal LFTs\par \par #3.  Fatty liver on imaging.\par \par Recommendations:\par \par #1.  Patient given written and oral instructions to have primary care doctor check Hep C screen if not done, followup LFTs, and determine colorectal cancer screening schedule.

## 2020-06-09 NOTE — ED ADULT NURSE NOTE - PSH
Acute Appendicitis  s/p appendectomy 5 years ago.  cystoscopy, Ureteral stent Anesthesia Type: 1% lidocaine with epinephrine

## 2021-03-13 ENCOUNTER — TRANSCRIPTION ENCOUNTER (OUTPATIENT)
Age: 56
End: 2021-03-13

## 2021-07-26 ENCOUNTER — APPOINTMENT (OUTPATIENT)
Dept: INTERNAL MEDICINE | Facility: CLINIC | Age: 56
End: 2021-07-26

## 2024-01-24 ENCOUNTER — APPOINTMENT (OUTPATIENT)
Dept: UROLOGY | Facility: CLINIC | Age: 59
End: 2024-01-24
Payer: MEDICAID

## 2024-01-24 VITALS
SYSTOLIC BLOOD PRESSURE: 144 MMHG | HEIGHT: 70 IN | TEMPERATURE: 98.3 F | HEART RATE: 79 BPM | RESPIRATION RATE: 17 BRPM | DIASTOLIC BLOOD PRESSURE: 90 MMHG | BODY MASS INDEX: 28.63 KG/M2 | WEIGHT: 200 LBS

## 2024-01-24 DIAGNOSIS — R39.9 UNSPECIFIED SYMPTOMS AND SIGNS INVOLVING THE GENITOURINARY SYSTEM: ICD-10-CM

## 2024-01-24 DIAGNOSIS — R10.31 RIGHT LOWER QUADRANT PAIN: ICD-10-CM

## 2024-01-24 DIAGNOSIS — R10.32 RIGHT LOWER QUADRANT PAIN: ICD-10-CM

## 2024-01-24 DIAGNOSIS — Z12.5 ENCOUNTER FOR SCREENING FOR MALIGNANT NEOPLASM OF PROSTATE: ICD-10-CM

## 2024-01-24 DIAGNOSIS — N20.0 CALCULUS OF KIDNEY: ICD-10-CM

## 2024-01-24 PROCEDURE — 99204 OFFICE O/P NEW MOD 45 MIN: CPT

## 2024-01-26 ENCOUNTER — APPOINTMENT (OUTPATIENT)
Dept: CT IMAGING | Facility: IMAGING CENTER | Age: 59
End: 2024-01-26
Payer: MEDICAID

## 2024-01-26 ENCOUNTER — OUTPATIENT (OUTPATIENT)
Dept: OUTPATIENT SERVICES | Facility: HOSPITAL | Age: 59
LOS: 1 days | End: 2024-01-26
Payer: MEDICAID

## 2024-01-26 DIAGNOSIS — R39.9 UNSPECIFIED SYMPTOMS AND SIGNS INVOLVING THE GENITOURINARY SYSTEM: ICD-10-CM

## 2024-01-26 DIAGNOSIS — R10.31 RIGHT LOWER QUADRANT PAIN: ICD-10-CM

## 2024-01-26 DIAGNOSIS — Z90.49 ACQUIRED ABSENCE OF OTHER SPECIFIED PARTS OF DIGESTIVE TRACT: Chronic | ICD-10-CM

## 2024-01-26 DIAGNOSIS — N20.0 CALCULUS OF KIDNEY: ICD-10-CM

## 2024-01-26 PROCEDURE — 74176 CT ABD & PELVIS W/O CONTRAST: CPT

## 2024-01-26 PROCEDURE — 74176 CT ABD & PELVIS W/O CONTRAST: CPT | Mod: 26

## 2024-01-27 LAB
APPEARANCE: CLEAR
BACTERIA UR CULT: NORMAL
BACTERIA: NEGATIVE /HPF
BILIRUBIN URINE: NEGATIVE
BLOOD URINE: NEGATIVE
CAST: 0 /LPF
COLOR: YELLOW
EPITHELIAL CELLS: 0 /HPF
GLUCOSE QUALITATIVE U: NEGATIVE MG/DL
KETONES URINE: NEGATIVE MG/DL
LEUKOCYTE ESTERASE URINE: NEGATIVE
MICROSCOPIC-UA: NORMAL
NITRITE URINE: NEGATIVE
PH URINE: 6
PROTEIN URINE: NEGATIVE MG/DL
PSA FREE FLD-MCNC: 32 %
PSA FREE SERPL-MCNC: 0.27 NG/ML
PSA SERPL-MCNC: 0.84 NG/ML
RED BLOOD CELLS URINE: 0 /HPF
SPECIFIC GRAVITY URINE: 1.02
UROBILINOGEN URINE: 0.2 MG/DL
WHITE BLOOD CELLS URINE: 0 /HPF

## 2024-02-02 ENCOUNTER — EMERGENCY (EMERGENCY)
Facility: HOSPITAL | Age: 59
LOS: 1 days | Discharge: HOME | End: 2024-02-02
Attending: EMERGENCY MEDICINE
Payer: MEDICAID

## 2024-02-02 VITALS
OXYGEN SATURATION: 100 % | HEART RATE: 87 BPM | RESPIRATION RATE: 20 BRPM | DIASTOLIC BLOOD PRESSURE: 79 MMHG | TEMPERATURE: 98 F | SYSTOLIC BLOOD PRESSURE: 127 MMHG

## 2024-02-02 VITALS
SYSTOLIC BLOOD PRESSURE: 139 MMHG | WEIGHT: 199.96 LBS | HEIGHT: 70 IN | DIASTOLIC BLOOD PRESSURE: 88 MMHG | HEART RATE: 79 BPM | OXYGEN SATURATION: 99 % | RESPIRATION RATE: 20 BRPM | TEMPERATURE: 99 F

## 2024-02-02 DIAGNOSIS — Z90.49 ACQUIRED ABSENCE OF OTHER SPECIFIED PARTS OF DIGESTIVE TRACT: Chronic | ICD-10-CM

## 2024-02-02 LAB
ALBUMIN SERPL ELPH-MCNC: 4.1 G/DL — SIGNIFICANT CHANGE UP (ref 3.3–5)
ALP SERPL-CCNC: 74 U/L — SIGNIFICANT CHANGE UP (ref 40–120)
ALT FLD-CCNC: 35 U/L — SIGNIFICANT CHANGE UP (ref 10–45)
ANION GAP SERPL CALC-SCNC: 10 MMOL/L — SIGNIFICANT CHANGE UP (ref 5–17)
APPEARANCE UR: CLEAR — SIGNIFICANT CHANGE UP
AST SERPL-CCNC: 28 U/L — SIGNIFICANT CHANGE UP (ref 10–40)
BACTERIA # UR AUTO: NEGATIVE /HPF — SIGNIFICANT CHANGE UP
BASOPHILS # BLD AUTO: 0.04 K/UL — SIGNIFICANT CHANGE UP (ref 0–0.2)
BASOPHILS NFR BLD AUTO: 0.5 % — SIGNIFICANT CHANGE UP (ref 0–2)
BILIRUB SERPL-MCNC: 0.3 MG/DL — SIGNIFICANT CHANGE UP (ref 0.2–1.2)
BILIRUB UR-MCNC: NEGATIVE — SIGNIFICANT CHANGE UP
BUN SERPL-MCNC: 14 MG/DL — SIGNIFICANT CHANGE UP (ref 7–23)
CALCIUM SERPL-MCNC: 8.5 MG/DL — SIGNIFICANT CHANGE UP (ref 8.4–10.5)
CAST: 0 /LPF — SIGNIFICANT CHANGE UP (ref 0–4)
CHLORIDE SERPL-SCNC: 102 MMOL/L — SIGNIFICANT CHANGE UP (ref 96–108)
CO2 SERPL-SCNC: 25 MMOL/L — SIGNIFICANT CHANGE UP (ref 22–31)
COLOR SPEC: YELLOW — SIGNIFICANT CHANGE UP
CREAT SERPL-MCNC: 1.01 MG/DL — SIGNIFICANT CHANGE UP (ref 0.5–1.3)
D DIMER BLD IA.RAPID-MCNC: 158 NG/ML DDU — SIGNIFICANT CHANGE UP
DIFF PNL FLD: ABNORMAL
EGFR: 86 ML/MIN/1.73M2 — SIGNIFICANT CHANGE UP
EOSINOPHIL # BLD AUTO: 0.09 K/UL — SIGNIFICANT CHANGE UP (ref 0–0.5)
EOSINOPHIL NFR BLD AUTO: 1.1 % — SIGNIFICANT CHANGE UP (ref 0–6)
GLUCOSE SERPL-MCNC: 137 MG/DL — HIGH (ref 70–99)
GLUCOSE UR QL: NEGATIVE MG/DL — SIGNIFICANT CHANGE UP
HCT VFR BLD CALC: 40.2 % — SIGNIFICANT CHANGE UP (ref 39–50)
HGB BLD-MCNC: 13.4 G/DL — SIGNIFICANT CHANGE UP (ref 13–17)
IMM GRANULOCYTES NFR BLD AUTO: 0.8 % — SIGNIFICANT CHANGE UP (ref 0–0.9)
KETONES UR-MCNC: NEGATIVE MG/DL — SIGNIFICANT CHANGE UP
LEUKOCYTE ESTERASE UR-ACNC: NEGATIVE — SIGNIFICANT CHANGE UP
LIDOCAIN IGE QN: 25 U/L — SIGNIFICANT CHANGE UP (ref 7–60)
LYMPHOCYTES # BLD AUTO: 0.69 K/UL — LOW (ref 1–3.3)
LYMPHOCYTES # BLD AUTO: 8.7 % — LOW (ref 13–44)
MAGNESIUM SERPL-MCNC: 2.1 MG/DL — SIGNIFICANT CHANGE UP (ref 1.6–2.6)
MCHC RBC-ENTMCNC: 29.2 PG — SIGNIFICANT CHANGE UP (ref 27–34)
MCHC RBC-ENTMCNC: 33.3 GM/DL — SIGNIFICANT CHANGE UP (ref 32–36)
MCV RBC AUTO: 87.6 FL — SIGNIFICANT CHANGE UP (ref 80–100)
MONOCYTES # BLD AUTO: 0.41 K/UL — SIGNIFICANT CHANGE UP (ref 0–0.9)
MONOCYTES NFR BLD AUTO: 5.2 % — SIGNIFICANT CHANGE UP (ref 2–14)
NEUTROPHILS # BLD AUTO: 6.66 K/UL — SIGNIFICANT CHANGE UP (ref 1.8–7.4)
NEUTROPHILS NFR BLD AUTO: 83.7 % — HIGH (ref 43–77)
NITRITE UR-MCNC: NEGATIVE — SIGNIFICANT CHANGE UP
NRBC # BLD: 0 /100 WBCS — SIGNIFICANT CHANGE UP (ref 0–0)
PH UR: 6.5 — SIGNIFICANT CHANGE UP (ref 5–8)
PHOSPHATE SERPL-MCNC: 2.6 MG/DL — SIGNIFICANT CHANGE UP (ref 2.5–4.5)
PLATELET # BLD AUTO: 202 K/UL — SIGNIFICANT CHANGE UP (ref 150–400)
POTASSIUM SERPL-MCNC: 3.8 MMOL/L — SIGNIFICANT CHANGE UP (ref 3.5–5.3)
POTASSIUM SERPL-SCNC: 3.8 MMOL/L — SIGNIFICANT CHANGE UP (ref 3.5–5.3)
PROT SERPL-MCNC: 7.2 G/DL — SIGNIFICANT CHANGE UP (ref 6–8.3)
PROT UR-MCNC: NEGATIVE MG/DL — SIGNIFICANT CHANGE UP
RBC # BLD: 4.59 M/UL — SIGNIFICANT CHANGE UP (ref 4.2–5.8)
RBC # FLD: 13.7 % — SIGNIFICANT CHANGE UP (ref 10.3–14.5)
RBC CASTS # UR COMP ASSIST: 346 /HPF — HIGH (ref 0–4)
SODIUM SERPL-SCNC: 137 MMOL/L — SIGNIFICANT CHANGE UP (ref 135–145)
SP GR SPEC: 1.02 — SIGNIFICANT CHANGE UP (ref 1–1.03)
SQUAMOUS # UR AUTO: 0 /HPF — SIGNIFICANT CHANGE UP (ref 0–5)
TROPONIN T, HIGH SENSITIVITY RESULT: 6 NG/L — SIGNIFICANT CHANGE UP (ref 0–51)
TROPONIN T, HIGH SENSITIVITY RESULT: <6 NG/L — SIGNIFICANT CHANGE UP (ref 0–51)
UROBILINOGEN FLD QL: 0.2 MG/DL — SIGNIFICANT CHANGE UP (ref 0.2–1)
WBC # BLD: 7.95 K/UL — SIGNIFICANT CHANGE UP (ref 3.8–10.5)
WBC # FLD AUTO: 7.95 K/UL — SIGNIFICANT CHANGE UP (ref 3.8–10.5)
WBC UR QL: 3 /HPF — SIGNIFICANT CHANGE UP (ref 0–5)

## 2024-02-02 PROCEDURE — 83690 ASSAY OF LIPASE: CPT

## 2024-02-02 PROCEDURE — 99285 EMERGENCY DEPT VISIT HI MDM: CPT

## 2024-02-02 PROCEDURE — 87491 CHLMYD TRACH DNA AMP PROBE: CPT

## 2024-02-02 PROCEDURE — 83735 ASSAY OF MAGNESIUM: CPT

## 2024-02-02 PROCEDURE — 85379 FIBRIN DEGRADATION QUANT: CPT

## 2024-02-02 PROCEDURE — 71045 X-RAY EXAM CHEST 1 VIEW: CPT

## 2024-02-02 PROCEDURE — 76770 US EXAM ABDO BACK WALL COMP: CPT

## 2024-02-02 PROCEDURE — 96374 THER/PROPH/DIAG INJ IV PUSH: CPT

## 2024-02-02 PROCEDURE — 85025 COMPLETE CBC W/AUTO DIFF WBC: CPT

## 2024-02-02 PROCEDURE — 36415 COLL VENOUS BLD VENIPUNCTURE: CPT

## 2024-02-02 PROCEDURE — 93005 ELECTROCARDIOGRAM TRACING: CPT

## 2024-02-02 PROCEDURE — 87086 URINE CULTURE/COLONY COUNT: CPT

## 2024-02-02 PROCEDURE — 80053 COMPREHEN METABOLIC PANEL: CPT

## 2024-02-02 PROCEDURE — 87591 N.GONORRHOEAE DNA AMP PROB: CPT

## 2024-02-02 PROCEDURE — 71045 X-RAY EXAM CHEST 1 VIEW: CPT | Mod: 26

## 2024-02-02 PROCEDURE — 81001 URINALYSIS AUTO W/SCOPE: CPT

## 2024-02-02 PROCEDURE — 84100 ASSAY OF PHOSPHORUS: CPT

## 2024-02-02 PROCEDURE — 84484 ASSAY OF TROPONIN QUANT: CPT

## 2024-02-02 PROCEDURE — 99285 EMERGENCY DEPT VISIT HI MDM: CPT | Mod: 25

## 2024-02-02 RX ORDER — ACETAMINOPHEN 500 MG
1000 TABLET ORAL ONCE
Refills: 0 | Status: COMPLETED | OUTPATIENT
Start: 2024-02-02 | End: 2024-02-02

## 2024-02-02 RX ORDER — ASPIRIN/CALCIUM CARB/MAGNESIUM 324 MG
324 TABLET ORAL ONCE
Refills: 0 | Status: COMPLETED | OUTPATIENT
Start: 2024-02-02 | End: 2024-02-02

## 2024-02-02 RX ORDER — SODIUM CHLORIDE 9 MG/ML
1000 INJECTION INTRAMUSCULAR; INTRAVENOUS; SUBCUTANEOUS ONCE
Refills: 0 | Status: COMPLETED | OUTPATIENT
Start: 2024-02-02 | End: 2024-02-02

## 2024-02-02 RX ADMIN — SODIUM CHLORIDE 1000 MILLILITER(S): 9 INJECTION INTRAMUSCULAR; INTRAVENOUS; SUBCUTANEOUS at 14:42

## 2024-02-02 RX ADMIN — Medication 324 MILLIGRAM(S): at 15:32

## 2024-02-02 RX ADMIN — Medication 400 MILLIGRAM(S): at 14:42

## 2024-02-02 NOTE — HISTORY OF PRESENT ILLNESS
[FreeTextEntry1] : Hx of kidney stones Last seen in 2014  New c/o bilateral groin pain Has been same pain for a few years PCP evaluated and unremarkable PSA was normal per pt He works as a  and spends 10-12 hrs per day sitting  No LUTS No hematuria  PVR 0cc  Exam: normal, uncircumcised, testes wnl, Abd soft NT NT, no hernias

## 2024-02-02 NOTE — ED PROVIDER NOTE - ATTENDING CONTRIBUTION TO CARE
Attending MD Cardona:  I have seen and examined this patient and fully participated in the care of this patient as the teaching attending. I personally made/approved the management plan and take responsibility for the patient management.      58-year-old gentleman presenting for evaluation of chest pain and suprapubic abdominal pain.  Chest pain began yesterday, it was initially sharp and somewhat pleuritic, today it now began central pressure-like.  It is nonpleuritic now this morning.  No associated nausea vomiting diaphoresis.  He was also having bilateral inguinal and suprapubic pain with radiation to the flanks and back, having some associated dysuria no hematuria no fevers or chills.  Of note, patient had a CT scan on 1/26 recently that showed tiny intrarenal stones but no ureteral stones.  Patient states he was given medicine and is feeling better.    Patient's vital signs are nonactionable he is sitting comfortably in the stretcher in no apparent distress.  Clear lungs anteriorly regular heart sounds the abdomen is soft nontender nondistended.  Peripheral pulses full and equal in the bilateral upper and lower extremities, no CVA tenderness bilaterally.  Extremities warm and well-perfused.    ECG recorded at 1417 independently interpreted by me, Dr Cole Cardona, shows normal sinus rhythm normal QRS axis normal intervals.  No acute diagnostic ischemic ST-T changes.    Patient presenting for evaluation of acute chest pain, regarding chest pain overall fairly atypical for cardiac ischemia but given age will obtain cardiac biomarkers to exclude MI.  Considered aortic dissection but highly unlikely given patient's well appearance and no movement to the upper back.  HEART score 1. Patient is at low risk for MACE at this HEART score and thus does not warrant any further urgent objective cardiac testing from the emergency department at this time, if cardiac biomarkers return normal.  Regarding suprapubic abdominal pain as well as dysuria, will perform renal ultrasound to rule out hydronephrosis, urinalysis urine culture STI screen and reassessment.  Given nontender abdomen at this time advanced cross-sectional imaging I do not think is warranted.  Patient did have abdominal CT scan again on 1/26 and was found to have intrarenal kidney stones but no ureteral stones.  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  Will reassess after initial diagnostics and symptom control.      *The above represents an initial assessment/impression. Please refer to progress notes for potential changes in patient clinical course*

## 2024-02-02 NOTE — ED PROVIDER NOTE - PROGRESS NOTE DETAILS
Discussed results with patient, discharge planning, and return precautions.  Patient understands and feels ready to be discharged home.  Carmen PGY1

## 2024-02-02 NOTE — ED PROCEDURE NOTE - PROCEDURE ADDITIONAL DETAILS
POCUS: Emergency Department Focused Ultrasound performed at patient's bedside.  The complete report will be available in PACS.    No hydronephrosis of bilateral kidneys.  Bladder collapsed.

## 2024-02-02 NOTE — ED PROVIDER NOTE - PHYSICAL EXAMINATION
Dia Morales DO (PGY1)   Physical Exam:    Gen: NAD, AOx3, non-toxic appearing  Lung: CTAB, no respiratory distress, no wheezes/rhonchi/rales B/L  CV: RRR, no murmurs, rubs or gallops  Abd: suprapubic region ttp, no CVA tenderness

## 2024-02-02 NOTE — ED ADULT NURSE NOTE - OBJECTIVE STATEMENT
Male 58 years old with past  medical of kidney stone brought in by EMS  for flank pain. Pt reports pain started yesterday at left chest wall radiating to his flank area. Reports pain at lower abdomen this morning associated with nausea and burning in urination. Denies blood in urine, fever or chills. Was given Toradol 15 mg IVP by EMS with relief. f.

## 2024-02-02 NOTE — ED PROVIDER NOTE - NSFOLLOWUPINSTRUCTIONS_ED_ALL_ED_FT
You came to the emergency department for chest pain and suprapubic pain, back pain.  He performed ultrasound imaging as well as lab results which demonstrated that you have kidney stones.    Please follow-up with your urologist in 1 to 3 days regarding your symptoms.  Please follow-up with a cardiologist regarding your chest pain as you may need an echo and stress test given your age.    Please return to emergency department if you have new or worsening pain, chest pain, shortness of breath, pass out, are unable to keep down food and fluids due to vomiting, or if you otherwise feel unwell.    Please take ibuprofen and Tylenol every 6-8 hours as needed for pain.

## 2024-02-02 NOTE — ED ADULT TRIAGE NOTE - IDEAL BODY WEIGHT(KG)
I gave him 5 days worth until we can speak at our appointment 
Patient stated he will not have enough Ativan until his next scheduled appointment 09/27/2021 
Spoke to the patient he is aware the medication was sent to the pharamcy 
73

## 2024-02-02 NOTE — ED PROVIDER NOTE - PATIENT PORTAL LINK FT
You can access the FollowMyHealth Patient Portal offered by [facility] by registering at the following website: [email or web address]. By joining [facility]’s FollowMyHealth portal, you will also be able to view your health information using other applications (apps) compatible with our system.

## 2024-02-02 NOTE — ED PROVIDER NOTE - OBJECTIVE STATEMENT
59 yo M no past medical history presents for one day of midsternal chest pain. Pain began yesterday at 6 pm while patient was sitting down, described as R sided sharp pain wosre with inspiration and movement and coming in several minute intervals. AT 7 am this mornign pain became a midsternal, non-radiating chest pressure. 57 yo M no past medical history presents for one day of midsternal chest pain. Pain began yesterday at 6 pm while patient was sitting down, described as R sided sharp pain worse with inspiration and movement and coming in several minute intervals. AT 7 am this morning pain became a midsternal, non-radiating chest pressure. Also endorsing suprapubic pain, dysuria, and bilateral back pain that feels similar to his kidney stones in the past.  Denies fever, chills, vomiting, penile discharge, testicular pain, or any other symptoms at this time.

## 2024-02-02 NOTE — ASSESSMENT
[Urinary Symptom or Sign (788.99\R39.89)] : implantation [FreeTextEntry1] : PVR 0cc exam unremarkable CT stone hunt  UA, C+S PSA

## 2024-02-02 NOTE — ED PROVIDER NOTE - NSCAREINITIATED _GEN_ER
I will START or STAY ON the medications listed below when I get home from the hospital:    rolling walker  -- Indication: For SBO (small bowel obstruction)    acetaminophen 325 mg oral tablet  -- 2 tab(s) by mouth every 6 hours, As needed, Mild Pain (1 - 3)  -- Indication: For SBO (small bowel obstruction)    Aspirin Enteric Coated 81 mg oral delayed release tablet  -- 1 tab(s) by mouth once a day  -- Indication: For SBO (small bowel obstruction)    naproxen  -- Indication: For SBO (small bowel obstruction)    Macrobid 100 mg oral capsule  -- 1 cap(s) by mouth 2 times a day   -- Finish all this medication unless otherwise directed by prescriber.  May discolor urine or feces.  Take with food or milk.    -- Indication: For SBO (small bowel obstruction)
Dia Morales(Resident)

## 2024-02-03 LAB
CULTURE RESULTS: NO GROWTH — SIGNIFICANT CHANGE UP
SPECIMEN SOURCE: SIGNIFICANT CHANGE UP

## 2024-02-03 PROCEDURE — 76770 US EXAM ABDO BACK WALL COMP: CPT | Mod: 26

## 2024-02-05 LAB
C TRACH RRNA SPEC QL NAA+PROBE: SIGNIFICANT CHANGE UP
N GONORRHOEA RRNA SPEC QL NAA+PROBE: SIGNIFICANT CHANGE UP
SPECIMEN SOURCE: SIGNIFICANT CHANGE UP

## 2025-03-31 ENCOUNTER — NON-APPOINTMENT (OUTPATIENT)
Age: 60
End: 2025-03-31

## 2025-03-31 ENCOUNTER — APPOINTMENT (OUTPATIENT)
Dept: UROLOGY | Facility: CLINIC | Age: 60
End: 2025-03-31
Payer: MEDICAID

## 2025-03-31 VITALS
OXYGEN SATURATION: 97 % | SYSTOLIC BLOOD PRESSURE: 155 MMHG | WEIGHT: 205 LBS | HEART RATE: 98 BPM | BODY MASS INDEX: 29.35 KG/M2 | HEIGHT: 70 IN | DIASTOLIC BLOOD PRESSURE: 92 MMHG

## 2025-03-31 DIAGNOSIS — R10.32 RIGHT LOWER QUADRANT PAIN: ICD-10-CM

## 2025-03-31 DIAGNOSIS — N20.0 CALCULUS OF KIDNEY: ICD-10-CM

## 2025-03-31 DIAGNOSIS — R10.31 RIGHT LOWER QUADRANT PAIN: ICD-10-CM

## 2025-03-31 PROCEDURE — 99214 OFFICE O/P EST MOD 30 MIN: CPT
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
## 2025-04-08 LAB
APPEARANCE: CLEAR
BACTERIA UR CULT: NORMAL
BACTERIA: NEGATIVE /HPF
BILIRUBIN URINE: NEGATIVE
BLOOD URINE: NEGATIVE
CAST: 0 /LPF
COLOR: YELLOW
EPITHELIAL CELLS: 0 /HPF
GLUCOSE QUALITATIVE U: NEGATIVE MG/DL
KETONES URINE: NEGATIVE MG/DL
LEUKOCYTE ESTERASE URINE: NEGATIVE
MICROSCOPIC-UA: NORMAL
NITRITE URINE: NEGATIVE
PH URINE: 5.5
PROTEIN URINE: NEGATIVE MG/DL
RED BLOOD CELLS URINE: 0 /HPF
SPECIFIC GRAVITY URINE: 1.02
UROBILINOGEN URINE: 1 MG/DL
WHITE BLOOD CELLS URINE: 0 /HPF

## 2025-09-15 ENCOUNTER — APPOINTMENT (OUTPATIENT)
Dept: UROLOGY | Facility: CLINIC | Age: 60
End: 2025-09-15